# Patient Record
Sex: MALE | Race: BLACK OR AFRICAN AMERICAN | NOT HISPANIC OR LATINO | Employment: UNEMPLOYED | URBAN - METROPOLITAN AREA
[De-identification: names, ages, dates, MRNs, and addresses within clinical notes are randomized per-mention and may not be internally consistent; named-entity substitution may affect disease eponyms.]

---

## 2023-09-01 ENCOUNTER — HOSPITAL ENCOUNTER (EMERGENCY)
Facility: HOSPITAL | Age: 38
Discharge: HOME | End: 2023-09-01
Attending: EMERGENCY MEDICINE
Payer: COMMERCIAL

## 2023-09-01 VITALS
OXYGEN SATURATION: 100 % | SYSTOLIC BLOOD PRESSURE: 135 MMHG | DIASTOLIC BLOOD PRESSURE: 79 MMHG | RESPIRATION RATE: 20 BRPM | HEART RATE: 87 BPM | WEIGHT: 176.15 LBS | TEMPERATURE: 98.1 F

## 2023-09-01 DIAGNOSIS — J45.901 ASTHMA EXACERBATION: ICD-10-CM

## 2023-09-01 DIAGNOSIS — J06.9 URI (UPPER RESPIRATORY INFECTION): Primary | ICD-10-CM

## 2023-09-01 DIAGNOSIS — Z76.0 MEDICATION REFILL: ICD-10-CM

## 2023-09-01 LAB
FLUAV RNA RESP QL NAA+PROBE: NEGATIVE
FLUBV RNA RESP QL NAA+PROBE: NEGATIVE
RSV RNA RESP QL NAA+PROBE: NEGATIVE
SARS-COV-2 RNA RESP QL NAA+PROBE: NEGATIVE

## 2023-09-01 PROCEDURE — 99283 EMERGENCY DEPT VISIT LOW MDM: CPT

## 2023-09-01 PROCEDURE — 99284 EMERGENCY DEPT VISIT MOD MDM: CPT | Performed by: EMERGENCY MEDICINE

## 2023-09-01 PROCEDURE — 0241U HB NFCT DS VIR RESP RNA 4 TRGT: CPT | Performed by: EMERGENCY MEDICINE

## 2023-09-01 RX ORDER — ALBUTEROL SULFATE 90 UG/1
2 AEROSOL, METERED RESPIRATORY (INHALATION) EVERY 6 HOURS PRN
Qty: 18 G | Refills: 0 | Status: SHIPPED | OUTPATIENT
Start: 2023-09-01

## 2023-09-01 RX ORDER — ALBUTEROL SULFATE 90 UG/1
2 AEROSOL, METERED RESPIRATORY (INHALATION) ONCE
Status: COMPLETED | OUTPATIENT
Start: 2023-09-01 | End: 2023-09-01

## 2023-09-01 RX ORDER — LEVETIRACETAM 750 MG/1
750 TABLET ORAL 2 TIMES DAILY
Qty: 180 TABLET | Refills: 0 | Status: SHIPPED | OUTPATIENT
Start: 2023-09-01

## 2023-09-01 RX ORDER — AZITHROMYCIN 250 MG/1
TABLET, FILM COATED ORAL
Qty: 6 TABLET | Refills: 0 | Status: SHIPPED | OUTPATIENT
Start: 2023-09-01 | End: 2023-09-04

## 2023-09-01 RX ORDER — PREDNISONE 20 MG/1
60 TABLET ORAL DAILY
Qty: 15 TABLET | Refills: 0 | Status: SHIPPED | OUTPATIENT
Start: 2023-09-01 | End: 2023-09-04

## 2023-09-01 RX ORDER — LEVETIRACETAM 750 MG/1
750 TABLET ORAL 2 TIMES DAILY
COMMUNITY

## 2023-09-01 RX ADMIN — ALBUTEROL SULFATE 2 PUFF: 90 AEROSOL, METERED RESPIRATORY (INHALATION) at 13:34

## 2023-09-01 NOTE — Clinical Note
Kati aPtel was seen and treated in our emergency department on 9/1/2023. Diagnosis:     Alfonso  . He may return on this date: 09/01/2023    Patient was seen in the emergency room. [facility]     If you have any questions or concerns, please don't hesitate to call.       Elisha Friedman MD    ______________________________           _______________          _______________  Hospital Representative                              Date                                Time

## 2023-09-01 NOTE — ED PROVIDER NOTES
History  Chief Complaint   Patient presents with   • Cold Like Symptoms     "I have a scratchy throat, cough, chest cold and I just got here from [place] and need my seizure med- Keppra ordered "     And is a 71-year-old male. He is a smoker. [facility] Reports that [facility] the [facility] have been sick with a cough. He presents with 1 day history of cough and chest congestion. His cough is productive. He also has nasal congestion. He has left ear pain. He has had a sore throat. He is not short of breath. No chest pain. Denies fever. In addition he has seizure disorder. He is out of his Keppra. He denies having a seizure. Prior to Admission Medications   Prescriptions Last Dose Informant Patient Reported? Taking?   levETIRAcetam (KEPPRA) 750 mg tablet   Yes Yes   Sig: Take 750 mg by mouth 2 (two) times a day      Facility-Administered Medications: None       Past Medical History:   Diagnosis Date   • Epilepsy ([address])        History reviewed. No pertinent surgical history. History reviewed. No pertinent family history. I have reviewed and agree with the history as documented. E-Cigarette/Vaping     E-Cigarette/Vaping Substances     Social History     Tobacco Use   • Smoking status: Every Day     Types: Cigarettes   • Smokeless tobacco: Never   Substance Use Topics   • Alcohol use: Not Currently   • Drug use: Not Currently       Review of Systems   Constitutional: Negative for chills and fever. HENT: Positive for congestion, ear pain and sore throat. Eyes: Negative for pain, redness and visual disturbance. Respiratory: Positive for cough. Negative for shortness of breath. Cardiovascular: Negative for chest pain and leg swelling. Gastrointestinal: Negative for abdominal pain, diarrhea and vomiting. Endocrine: Negative for polydipsia and polyuria. Genitourinary: Negative for dysuria, frequency and hematuria.    Musculoskeletal: Negative for back pain and neck pain. Skin: Negative for rash and wound. Allergic/Immunologic: Negative for immunocompromised state. Neurological: Positive for seizures. Negative for weakness, numbness and headaches. Psychiatric/Behavioral: Negative for hallucinations and suicidal ideas. All other systems reviewed and are negative. Physical Exam  Physical Exam  Vitals reviewed. Constitutional:       General: He is not in acute distress. Appearance: He is not toxic-appearing. HENT:      Head: Normocephalic and atraumatic. Right Ear: Tympanic membrane normal.      Left Ear: Tympanic membrane normal.      Nose: Nose normal.      Mouth/Throat:      Mouth: Mucous membranes are moist.      Pharynx: Oropharynx is clear. No oropharyngeal exudate or posterior oropharyngeal erythema. Eyes:      General:         Right eye: No discharge. Left eye: No discharge. Conjunctiva/sclera: Conjunctivae normal.   Cardiovascular:      Rate and Rhythm: Normal rate and regular rhythm. Pulses: Normal pulses. Heart sounds: Normal heart sounds. No murmur heard. No friction rub. No gallop. Pulmonary:      Effort: Pulmonary effort is normal. No respiratory distress. Breath sounds: No stridor. Wheezing present. No rhonchi or rales. Comments: There is a scattered wheeze. Abdominal:      General: Bowel sounds are normal. There is no distension. Palpations: Abdomen is soft. Tenderness: There is no abdominal tenderness. There is no right CVA tenderness, left CVA tenderness, guarding or rebound. Musculoskeletal:         General: No swelling, tenderness, deformity or signs of injury. Normal range of motion. Cervical back: Normal range of motion and neck supple. No rigidity. Right lower leg: No edema. Left lower leg: No edema. Comments: No calf pain or unilateral leg swelling   Skin:     General: Skin is warm and dry.       Coloration: Skin is not jaundiced or pale. Findings: No bruising, erythema or rash. Neurological:      General: No focal deficit present. Mental Status: He is alert and oriented to person, place, and time. Cranial Nerves: No facial asymmetry. Sensory: No sensory deficit. Motor: Motor function is intact. Psychiatric:         Mood and Affect: Mood normal.         Behavior: Behavior normal.         Vital Signs  ED Triage Vitals [09/01/23 1316]   Temperature Pulse Respirations Blood Pressure SpO2   98.1 °F (36.7 °C) 87 20 135/79 100 %      Temp Source Heart Rate Source Patient Position - Orthostatic VS BP Location FiO2 (%)   Oral Monitor Sitting Left arm --      Pain Score       --           Vitals:    09/01/23 1316   BP: 135/79   Pulse: 87   Patient Position - Orthostatic VS: Sitting         Visual Acuity      ED Medications  Medications   albuterol (PROVENTIL HFA,VENTOLIN HFA) inhaler 2 puff (2 puffs Inhalation Given 9/1/23 1334)       Diagnostic Studies  Results Reviewed     Procedure Component Value Units Date/Time    FLU/RSV/COVID - if FLU/RSV clinically relevant [[id number]] Collected: 09/01/23 1334    Lab Status: In process Specimen: Nares from Nose Updated: 09/01/23 1340                 No orders to display              Procedures  Procedures         ED Course                               SBIRT 22yo+    Flowsheet Row Most Recent Value   Initial Alcohol Screen: US AUDIT-C     1. How often do you have a drink containing alcohol? 0 Filed at: 09/01/2023 1317   3a. Male UNDER 65: How often do you have five or more drinks on one occasion? 0 Filed at: 09/01/2023 1317   Audit-C Score 0 Filed at: 09/01/2023 1317   JORDAN: How many times in the past year have you. .. Used an illegal drug or used a prescription medication for non-medical reasons? Never Filed at: 09/01/2023 1317                    Medical Decision Making  Patient does have a wheeze. No respiratory distress. Will discharge with inhaler and steroids. Will place on antibiotics to cover bacterial bronchitis/copd. Doubt pneumonia. We will test for COVID and flu. Doubt pulmonary embolism or congestive heart failure. We will refill seizure medications. Risk  Prescription drug management. Decision regarding hospitalization. Disposition  Final diagnoses:   URI (upper respiratory infection)   Asthma exacerbation   Medication refill     Time reflects when diagnosis was documented in both MDM as applicable and the Disposition within this note     Time User Action Codes Description Comment    9/1/2023  1:29 PM Toronto Amas Add [J06.9] URI (upper respiratory infection)     9/1/2023  1:29 PM Toronto Amas Add [N68.684] Asthma exacerbation     9/1/2023  1:29 PM Toronto Amas Add [Z76.0] Medication refill       ED Disposition     ED Disposition   Discharge    Condition   Stable    Date/Time   Fri Sep 1, 2023  1:28 PM    Comment   Bonifacio Love discharge to home/self care. Follow-up Information     Follow up With Specialties Details [facility] In [facility] week  [address]  [phone]            Discharge Medication List as of 9/1/2023  1:33 PM      START taking these medications    Details   albuterol (ProAir HFA) 90 mcg/act inhaler Inhale 2 puffs every 6 (six) hours as needed for wheezing, Starting Fri 9/1/2023, Normal      azithromycin (Zithromax Z-Pedro Pablo) 250 mg tablet Take 2 tablets today then 1 tablet daily x 4 days, Normal      !! levETIRAcetam (Keppra) 750 mg tablet Take 1 tablet (750 mg total) by mouth 2 (two) times a day, Starting Fri 9/1/2023, Normal      predniSONE 20 mg tablet Take 3 tablets (60 mg total) by mouth daily for 5 days, Starting Fri 9/1/2023, Until Wed 9/6/2023, Normal       !! - Potential duplicate medications found. Please discuss with provider.       CONTINUE these medications which have NOT CHANGED    Details   !! levETIRAcetam (KEPPRA) 750 mg tablet Take 750 mg by mouth 2 (two) times a day, Historical Med       !! - Potential duplicate medications found. Please discuss with provider. No discharge procedures on file.     PDMP Review     None          ED Provider  Electronically Signed by           Matthew Hudson MD  09/01/23 [id number]

## 2023-09-04 ENCOUNTER — HOSPITAL ENCOUNTER (EMERGENCY)
Facility: HOSPITAL | Age: 38
Discharge: HOME | End: 2023-09-04
Attending: EMERGENCY MEDICINE
Payer: COMMERCIAL

## 2023-09-04 VITALS
TEMPERATURE: 97.1 F | OXYGEN SATURATION: 99 % | BODY MASS INDEX: 23.72 KG/M2 | HEART RATE: 73 BPM | HEIGHT: 73 IN | WEIGHT: 179 LBS | SYSTOLIC BLOOD PRESSURE: 127 MMHG | DIASTOLIC BLOOD PRESSURE: 82 MMHG | RESPIRATION RATE: 18 BRPM

## 2023-09-04 DIAGNOSIS — F41.9 ANXIETY: Primary | ICD-10-CM

## 2023-09-04 DIAGNOSIS — Z76.0 MEDICATION REFILL: ICD-10-CM

## 2023-09-04 LAB
BACTERIA UR QL AUTO: ABNORMAL /HPF
BILIRUB UR QL STRIP: NEGATIVE
CLARITY UR: CLEAR
COLOR UR: ABNORMAL
GLUCOSE UR STRIP-MCNC: NEGATIVE MG/DL
HGB UR QL STRIP.AUTO: 10
KETONES UR STRIP-MCNC: NEGATIVE MG/DL
LEUKOCYTE ESTERASE UR QL STRIP: 25
NITRITE UR QL STRIP: NEGATIVE
NON-SQ EPI CELLS URNS QL MICRO: ABNORMAL /HPF
PH UR STRIP.AUTO: 5 [PH]
PROT UR STRIP-MCNC: NEGATIVE MG/DL
RBC #/AREA URNS AUTO: ABNORMAL /HPF
SP GR UR STRIP.AUTO: 1.02 (ref 1–1.04)
UROBILINOGEN UA: 1 MG/DL
WBC #/AREA URNS AUTO: ABNORMAL /HPF

## 2023-09-04 PROCEDURE — 99284 EMERGENCY DEPT VISIT MOD MDM: CPT | Performed by: EMERGENCY MEDICINE

## 2023-09-04 PROCEDURE — 87591 N.GONORRHOEAE DNA AMP PROB: CPT | Performed by: EMERGENCY MEDICINE

## 2023-09-04 PROCEDURE — 81001 URINALYSIS AUTO W/SCOPE: CPT | Performed by: EMERGENCY MEDICINE

## 2023-09-04 PROCEDURE — 99283 EMERGENCY DEPT VISIT LOW MDM: CPT

## 2023-09-04 PROCEDURE — 87491 CHLMYD TRACH DNA AMP PROBE: CPT | Performed by: EMERGENCY MEDICINE

## 2023-09-04 RX ORDER — BUPROPION HYDROCHLORIDE 75 MG/1
75 TABLET ORAL 2 TIMES DAILY
Qty: 14 TABLET | Refills: 0 | Status: SHIPPED | OUTPATIENT
Start: 2023-09-04 | End: 2023-09-11

## 2023-09-04 NOTE — ED PROVIDER NOTES
History  Chief Complaint   Patient presents with   • Anxiety     Patient reports he just got to [place] about 1m ago[facility]. Takes wellbutrin for anxiety but has ran out, does not know the dose. "I feel anxious being around all those men". Insurance kicks in [facility], he needs resources for PCP and psychiatrist. Also reports he wants a STD test, he already rec'd tx in his home state of [place] but "I never got the results". -SI/-HI. 59-year-old male, history of anxiety, epilepsy, maintained on Keppra. States he takes Wellbutrin for his anxiety and agoraphobia. He has run out of medications due to waiting for his insurance to kick in and remove them from out of state. Requesting medication refill and PCP referral.  States he feels better being away from the homeless shelter where he feels like things are overcrowded. Denies any suicidality or homicidality denies any auditory or visual hallucinations. Prior to Admission Medications   Prescriptions Last Dose Informant Patient Reported? Taking? albuterol (ProAir HFA) 90 mcg/act inhaler   No No   Sig: Inhale 2 puffs every 6 (six) hours as needed for wheezing   levETIRAcetam (KEPPRA) 750 mg tablet   Yes No   Sig: Take 750 mg by mouth 2 (two) times a day   levETIRAcetam (Keppra) 750 mg tablet   No No   Sig: Take 1 tablet (750 mg total) by mouth 2 (two) times a day      Facility-Administered Medications: None       Past Medical History:   Diagnosis Date   • Epilepsy ([address])        History reviewed. No pertinent surgical history. History reviewed. No pertinent family history. I have reviewed and agree with the history as documented.     E-Cigarette/Vaping   • E-Cigarette Use Never User      E-Cigarette/Vaping Substances     Social History     Tobacco Use   • Smoking status: Every Day     Types: Cigarettes, Cigars   • Smokeless tobacco: Never   Vaping Use   • Vaping Use: Never used   Substance Use Topics   • Alcohol use: Not Currently   • Drug use: Yes     Frequency: 4.0 times per week     Types: Marijuana       Review of Systems   Constitutional: Negative. Negative for chills and fever. HENT: Negative. Negative for rhinorrhea, sore throat, trouble swallowing and voice change. Eyes: Negative. Negative for pain and visual disturbance. Respiratory: Negative. Negative for cough, shortness of breath and wheezing. Cardiovascular: Negative. Negative for chest pain and palpitations. Gastrointestinal: Negative for abdominal pain, diarrhea, nausea and vomiting. Genitourinary: Negative. Negative for dysuria and frequency. Musculoskeletal: Negative. Negative for neck pain and neck stiffness. Skin: Negative. Negative for rash. Neurological: Negative. Negative for dizziness, speech difficulty, weakness, light-headedness and numbness. Psychiatric/Behavioral: The patient is nervous/anxious. Physical Exam  Physical Exam  Vitals and nursing note reviewed. Constitutional:       General: He is not in acute distress. Appearance: He is well-developed. HENT:      Head: Normocephalic and atraumatic. Eyes:      Conjunctiva/sclera: Conjunctivae normal.      Pupils: Pupils are equal, round, and reactive to light. Neck:      Trachea: No tracheal deviation. Cardiovascular:      Rate and Rhythm: Normal rate and regular rhythm. Pulmonary:      Effort: Pulmonary effort is normal. No respiratory distress. Breath sounds: Normal breath sounds. No wheezing or rales. Abdominal:      General: Bowel sounds are normal. There is no distension. Palpations: Abdomen is soft. Tenderness: There is no abdominal tenderness. There is no guarding or rebound. Musculoskeletal:         General: No tenderness or deformity. Normal range of motion. Cervical back: Normal range of motion and neck supple. Skin:     General: Skin is warm and dry. Capillary Refill: Capillary refill takes less than 2 seconds. Findings: No rash. Neurological:      Mental Status: He is alert and oriented to person, place, and time. Psychiatric:         Behavior: Behavior normal.         Vital Signs  ED Triage Vitals   Temperature Pulse Respirations Blood Pressure SpO2   09/04/23 1255 09/04/23 1232 09/04/23 1232 09/04/23 1232 09/04/23 1232   (!) 97.1 °F (36.2 °C) 73 18 127/82 99 %      Temp Source Heart Rate Source Patient Position - Orthostatic VS BP Location FiO2 (%)   09/04/23 1255 09/04/23 1232 09/04/23 1232 09/04/23 1232 --   Tympanic Monitor Lying Left arm       Pain Score       09/04/23 1232       No Pain           Vitals:    09/04/23 1232   BP: 127/82   Pulse: 73   Patient Position - Orthostatic VS: Lying         Visual Acuity      ED Medications  Medications - No data to display    Diagnostic Studies  Results Reviewed     Procedure Component Value Units Date/Time    Urine Microscopic [[id number]]  (Abnormal) Collected: 09/04/23 1302    Lab Status: Final result Specimen: Urine, Other Updated: 09/04/23 1407     RBC, UA 1-2 /hpf      WBC, UA 4-10 /hpf      Epithelial Cells Occasional /hpf      Bacteria, UA Occasional /hpf     UA (URINE) with reflex to Scope [[id number]]  (Abnormal) Collected: 09/04/23 1302    Lab Status: Final result Specimen: Urine, Other Updated: 09/04/23 1316     Color, UA Callie     Clarity, UA Clear     Specific Gravity, UA 1.025     pH, UA 5.0     Leukocytes, UA 25.0     Nitrite, UA Negative     Protein, UA Negative mg/dl      Glucose, UA Negative mg/dl      Ketones, UA Negative mg/dl      Bilirubin, UA Negative     Occult Blood, UA 10.0     UROBILINOGEN UA 1.0 mg/dL     Chlamydia/GC amplified DNA by PCR [[id number]] Collected: 09/04/23 1302    Lab Status:  In process Specimen: Urine, Other Updated: 09/04/23 1307                 No orders to display              Procedures  Procedures         ED Course                               SBIRT 20yo+    Flowsheet Row Most Recent Value   Initial Alcohol Screen: US AUDIT-C     1. How often do you have a drink containing alcohol? 0 Filed at: 09/04/2023 1233   2. How many drinks containing alcohol do you have on a typical day you are drinking? 0 Filed at: 09/04/2023 1233   3a. Male UNDER 65: How often do you have five or more drinks on one occasion? 0 Filed at: 09/04/2023 1233   3b. FEMALE Any Age, or MALE 65+: How often do you have 4 or more drinks on one occassion? 0 Filed at: 09/04/2023 1233   Audit-C Score 0 Filed at: 09/04/2023 1233   JORDAN: How many times in the past year have you. .. Used an illegal drug or used a prescription medication for non-medical reasons? Daily or Almost Daily Filed at: 09/04/2023 1233   DAST-10: In the past 12 months. ..    1. Have you used drugs other than those required for medical reasons? 0 Filed at: 09/04/2023 1233   2. Do you use more than one drug at a time? 0 Filed at: 09/04/2023 1233   3. Have you had medical problems as a result of your drug use (e.g., memory loss, hepatitis, convulsions, bleeding, etc.)? 0 Filed at: 09/04/2023 1233   4. Have you had "blackouts" or "flashbacks" as a result of drug use? YesNo 0 Filed at: 09/04/2023 1233   5. Do you ever feel bad or guilty about your drug use? 0 Filed at: 09/04/2023 1233   6. Does your spouse (or parent) ever complain about your involvement with drugs? 0 Filed at: 09/04/2023 1233   7. Have you neglected your family because of your use of drugs? 0 Filed at: 09/04/2023 1233   8. Have you engaged in illegal activities in order to obtain drugs? 0 Filed at: 09/04/2023 1233   9. Have you ever experienced withdrawal symptoms (felt sick) when you stopped taking drugs? 0 Filed at: 09/04/2023 1233   10. Are you always able to stop using drugs when you want to? 0 Filed at: 09/04/2023 1233   DAST-10 Score 0 Filed at: 09/04/2023 1233                    Medical Decision Making  Well-appearing 25-year-old male, presenting to the ED for medication help.   After initial evaluation he also endorses having some penile drainage, states he was tested for STDs and treated in [place]. Denies any sick contacts. States he is asymptomatic except for noticing what he thought might have been some discharge in his underwear over the last few days. No testicular pain no rashes or lesions. He is requesting repeat STD testing today. He is declining any prophylactic antibiotics. States his phone works and he will be able to follow-up and be reached for any test results if he is positive. Amount and/or Complexity of Data Reviewed  Labs: ordered. Risk  Prescription drug management. Disposition  Final diagnoses:   Anxiety   Medication refill     Time reflects when diagnosis was documented in both MDM as applicable and the Disposition within this note     Time User Action Codes Description Comment    9/4/2023 12:57 PM Subha Coello Add [F41.9] Anxiety     9/4/2023  1:02 PM Subha Coello Add [Z76.0] Medication refill       ED Disposition     ED Disposition   Discharge    Condition   Stable    Date/Time   Mon Sep 4, 2023 [address] discharge to home/self care.                Follow-up Information     Follow up With Specialties Details Why Contact Info Cj Carpenter In 1 week  [address] [facility], [facility] [facility] [id number]  [address] [facility], [address] [facility], [address] [facility]          Discharge Medication List as of 9/4/2023  1:04 PM      START taking these medications    Details   buPROPion [facility]) 75 mg tablet Take 1 tablet (75 mg total) by mouth 2 (two) times a day for 7 days, Starting Mon 9/4/2023, Until Mon 9/11/2023, Normal         CONTINUE these medications which have NOT CHANGED    Details   albuterol (ProAir HFA) 90 mcg/act inhaler Inhale 2 puffs every 6 (six) hours as needed for wheezing, Starting Fri 9/1/2023, Normal      !! levETIRAcetam (KEPPRA) 750 mg tablet Take 750 mg by mouth 2 (two) times a day, Historical Med      !! levETIRAcetam (Keppra) 750 mg tablet Take 1 tablet (750 mg total) by mouth 2 (two) times a day, Starting Fri 9/1/2023, Normal       !! - Potential duplicate medications found. Please discuss with provider. No discharge procedures on file.     PDMP Review     None          ED Provider  Electronically Signed by           Jessica Underwood DO  09/04/23 3777

## 2023-09-05 LAB
C TRACH DNA SPEC QL NAA+PROBE: NEGATIVE
N GONORRHOEA DNA SPEC QL NAA+PROBE: NEGATIVE

## 2024-07-30 ENCOUNTER — HOSPITAL ENCOUNTER (EMERGENCY)
Facility: HOSPITAL | Age: 39
Discharge: HOME | End: 2024-07-30
Attending: EMERGENCY MEDICINE | Admitting: EMERGENCY MEDICINE
Payer: COMMERCIAL

## 2024-07-30 VITALS
HEART RATE: 72 BPM | SYSTOLIC BLOOD PRESSURE: 132 MMHG | DIASTOLIC BLOOD PRESSURE: 71 MMHG | RESPIRATION RATE: 20 BRPM | WEIGHT: 165.9 LBS | BODY MASS INDEX: 21.89 KG/M2 | TEMPERATURE: 98.7 F | OXYGEN SATURATION: 99 %

## 2024-07-30 DIAGNOSIS — N48.9 PENILE LESION: Primary | ICD-10-CM

## 2024-07-30 PROCEDURE — 99283 EMERGENCY DEPT VISIT LOW MDM: CPT | Performed by: PHYSICIAN ASSISTANT

## 2024-07-30 PROCEDURE — 99283 EMERGENCY DEPT VISIT LOW MDM: CPT

## 2024-07-30 NOTE — ED PROVIDER NOTES
History  Chief Complaint   Patient presents with    Penis / Scrotum Problem     States he has had warts on his penis for 1.5 weeks     38-year-old sexually active male presenting for evaluation of lesions to the penis he reports they are nonpainful he reports no other associated symptoms, dysuria, hematuria, scrotal pain or swelling, lesions to the scrotum, abdominal pain, nausea, vomiting.  He reports no penile discharge.      Penis / Scrotum Problem  Presenting symptoms: no dysuria    Associated symptoms: no abdominal pain, no fever, no hematuria, no nausea and no vomiting        Prior to Admission Medications   Prescriptions Last Dose Informant Patient Reported? Taking?   albuterol (ProAir HFA) 90 mcg/act inhaler   No No   Sig: Inhale 2 puffs every 6 (six) hours as needed for wheezing   buPROPion (WELLBUTRIN) 75 mg tablet   No No   Sig: Take 1 tablet (75 mg total) by mouth 2 (two) times a day for 7 days   levETIRAcetam (KEPPRA) 750 mg tablet   Yes No   Sig: Take 750 mg by mouth 2 (two) times a day   levETIRAcetam (Keppra) 750 mg tablet   No No   Sig: Take 1 tablet (750 mg total) by mouth 2 (two) times a day      Facility-Administered Medications: None       Past Medical History:   Diagnosis Date    Epilepsy (HCC)        History reviewed. No pertinent surgical history.    History reviewed. No pertinent family history.  I have reviewed and agree with the history as documented.    E-Cigarette/Vaping    E-Cigarette Use Never User      E-Cigarette/Vaping Substances     Social History     Tobacco Use    Smoking status: Every Day     Types: Cigarettes, Cigars    Smokeless tobacco: Never   Vaping Use    Vaping status: Never Used   Substance Use Topics    Alcohol use: Yes     Comment: occ    Drug use: Yes     Frequency: 4.0 times per week     Types: Marijuana       Review of Systems   Constitutional:  Negative for chills, fatigue and fever.   HENT:  Negative for congestion, ear pain, rhinorrhea and sore throat.    Eyes:   Negative for redness.   Respiratory:  Negative for chest tightness and shortness of breath.    Cardiovascular:  Negative for chest pain and palpitations.   Gastrointestinal:  Negative for abdominal pain, nausea and vomiting.   Genitourinary:  Negative for dysuria and hematuria.        Penile lesions   Musculoskeletal: Negative.    Skin:  Negative for rash.   Neurological:  Negative for dizziness, syncope, light-headedness and numbness.       Physical Exam  Physical Exam  Vitals and nursing note reviewed.   Constitutional:       Appearance: Normal appearance. He is well-developed.   HENT:      Head: Normocephalic and atraumatic.   Eyes:      General: No scleral icterus.     Pupils: Pupils are equal, round, and reactive to light.   Cardiovascular:      Rate and Rhythm: Normal rate and regular rhythm.      Pulses: Normal pulses.   Pulmonary:      Effort: Pulmonary effort is normal. No respiratory distress.      Breath sounds: No stridor.   Abdominal:      General: There is no distension.      Palpations: There is no mass.   Genitourinary:         Comments: Erin RN at Bedside chaperone for exam  Painless, raised, nonerythematous, flesh-colored lesions in the area as depicted above.  There are no ulcers, lymphadenopathy or lesions consistent with herpetic rash.  Musculoskeletal:      Cervical back: Normal range of motion.   Skin:     General: Skin is warm and dry.      Capillary Refill: Capillary refill takes less than 2 seconds.      Coloration: Skin is not jaundiced.   Neurological:      Mental Status: He is alert and oriented to person, place, and time.      Gait: Gait normal.   Psychiatric:         Mood and Affect: Mood normal.         Vital Signs  ED Triage Vitals [07/30/24 0951]   Temperature Pulse Respirations Blood Pressure SpO2   98.7 °F (37.1 °C) 72 20 132/71 99 %      Temp Source Heart Rate Source Patient Position - Orthostatic VS BP Location FiO2 (%)   Tympanic Monitor Lying Left arm --      Pain Score      "  --           Vitals:    07/30/24 0951   BP: 132/71   Pulse: 72   Patient Position - Orthostatic VS: Lying         Visual Acuity      ED Medications  Medications - No data to display    Diagnostic Studies  Results Reviewed       None                   No orders to display              Procedures  Procedures         ED Course  ED Course as of 07/30/24 0958   Tue Jul 30, 2024   0956 Patient was reexamined at this time and was found to be stable for discharge.  Return to emergency department criteria was reviewed with the patient who verbalized understanding and was agreeable to discharge and the treatment plan at this time.                                               Medical Decision Making  38-year-old male presenting for evaluation of painless raised flesh-colored lesions to the penis after unprotected intercourse.  He denies other associated symptoms and was offered STI testing however declined reporting he would present immediately to the [facility] for free rapid testing and follow-up with family care provider/urology.  These follow-up locations and phone numbers were provided for the patient.  Lesions are consistent with genital warts versus other STI, lower suspicion for herpetic lesions versus syphilis given lack of erythematous, ulcerative, lesions with lymphadenopathy.    Strict return to ED precautions discussed. Patient recommended to follow up promptly with appropriate outpatient provider.Patient and/or family members verbalizes understanding and agrees with plan. Patient is stable for discharge.     Portions of the record may have been created with voice recognition software. Occasional wrong word or \"sound a like\" substitutions may have occurred due to the inherent limitations of voice recognition software. Read the chart carefully and recognize, using context, where substitutions have occurred.                     Disposition  Final diagnoses:   Penile lesion     Time reflects when " diagnosis was documented in both MDM as applicable and the Disposition within this note       Time User Action Codes Description Comment    7/30/2024  9:56 AM Najma Austin Add [N48.9] Penile lesion           ED Disposition       ED Disposition   Discharge    Condition   Stable    Date/Time   Tue Jul 30, 2024  9:56 AM    Comment   Alfonsous Tremayne Williams discharge to home/self care.                   Follow-up Information       Follow up With Specialties Details Why Contact Info Additional Information    [facility] Internal Medicine Schedule an appointment as soon as possible for a visit in 1 day for further testing [address] [address]  [phone]       Stephen Henning MD Family Medicine Schedule an appointment as soon as possible for a visit   [address] [facility] [place] [address]  [phone]       [facility] [address] [address]  [phone] [address], [place], [place], [address]   [phone]            Patient's Medications   Discharge Prescriptions    No medications on file       No discharge procedures on file.    PDMP Review       None            ED Provider  Electronically Signed by             Najma Austin PA-C  07/30/24 1168

## 2024-08-04 ENCOUNTER — HOSPITAL ENCOUNTER (EMERGENCY)
Facility: HOSPITAL | Age: 39
Discharge: HOME | End: 2024-08-04
Attending: EMERGENCY MEDICINE
Payer: COMMERCIAL

## 2024-08-04 VITALS
HEART RATE: 65 BPM | DIASTOLIC BLOOD PRESSURE: 92 MMHG | SYSTOLIC BLOOD PRESSURE: 148 MMHG | RESPIRATION RATE: 16 BRPM | OXYGEN SATURATION: 96 % | TEMPERATURE: 97.5 F

## 2024-08-04 DIAGNOSIS — K08.89 DENTALGIA: Primary | ICD-10-CM

## 2024-08-04 PROCEDURE — 99284 EMERGENCY DEPT VISIT MOD MDM: CPT

## 2024-08-04 PROCEDURE — 96372 THER/PROPH/DIAG INJ SC/IM: CPT

## 2024-08-04 PROCEDURE — 99282 EMERGENCY DEPT VISIT SF MDM: CPT

## 2024-08-04 RX ORDER — CLINDAMYCIN HYDROCHLORIDE 150 MG/1
450 CAPSULE ORAL ONCE
Status: COMPLETED | OUTPATIENT
Start: 2024-08-04 | End: 2024-08-04

## 2024-08-04 RX ORDER — CLINDAMYCIN HYDROCHLORIDE 150 MG/1
450 CAPSULE ORAL 3 TIMES DAILY
Qty: 63 CAPSULE | Refills: 0 | Status: SHIPPED | OUTPATIENT
Start: 2024-08-04 | End: 2024-08-05

## 2024-08-04 RX ORDER — ACETAMINOPHEN 500 MG
1000 TABLET ORAL EVERY 6 HOURS PRN
Qty: 60 TABLET | Refills: 0 | Status: SHIPPED | OUTPATIENT
Start: 2024-08-04

## 2024-08-04 RX ORDER — IBUPROFEN 800 MG/1
800 TABLET ORAL EVERY 8 HOURS PRN
Qty: 21 TABLET | Refills: 0 | Status: SHIPPED | OUTPATIENT
Start: 2024-08-04

## 2024-08-04 RX ORDER — KETOROLAC TROMETHAMINE 30 MG/ML
15 INJECTION, SOLUTION INTRAMUSCULAR; INTRAVENOUS ONCE
Status: COMPLETED | OUTPATIENT
Start: 2024-08-04 | End: 2024-08-04

## 2024-08-04 RX ADMIN — CLINDAMYCIN HYDROCHLORIDE 450 MG: 150 CAPSULE ORAL at 00:59

## 2024-08-04 RX ADMIN — KETOROLAC TROMETHAMINE 15 MG: 30 INJECTION, SOLUTION INTRAMUSCULAR; INTRAVENOUS at 01:00

## 2024-08-05 RX ORDER — CLINDAMYCIN HYDROCHLORIDE 150 MG/1
450 CAPSULE ORAL 3 TIMES DAILY
Qty: 63 CAPSULE | Refills: 0 | Status: SHIPPED | OUTPATIENT
Start: 2024-08-05 | End: 2024-08-12

## 2025-01-28 ENCOUNTER — HOSPITAL ENCOUNTER (EMERGENCY)
Facility: HOSPITAL | Age: 40
Discharge: HOME | End: 2025-01-28
Attending: EMERGENCY MEDICINE
Payer: COMMERCIAL

## 2025-01-28 VITALS
BODY MASS INDEX: 22.43 KG/M2 | OXYGEN SATURATION: 99 % | HEART RATE: 87 BPM | WEIGHT: 170 LBS | SYSTOLIC BLOOD PRESSURE: 118 MMHG | TEMPERATURE: 99.2 F | DIASTOLIC BLOOD PRESSURE: 74 MMHG | RESPIRATION RATE: 20 BRPM

## 2025-01-28 DIAGNOSIS — J10.1 INFLUENZA A: Primary | ICD-10-CM

## 2025-01-28 DIAGNOSIS — R11.0 NAUSEA: ICD-10-CM

## 2025-01-28 DIAGNOSIS — M79.10 MYALGIA: ICD-10-CM

## 2025-01-28 LAB
FLUAV RNA RESP QL NAA+PROBE: POSITIVE
FLUBV RNA RESP QL NAA+PROBE: NEGATIVE
RSV RNA RESP QL NAA+PROBE: NEGATIVE
SARS-COV-2 RNA RESP QL NAA+PROBE: NEGATIVE

## 2025-01-28 PROCEDURE — 96361 HYDRATE IV INFUSION ADD-ON: CPT

## 2025-01-28 PROCEDURE — 96375 TX/PRO/DX INJ NEW DRUG ADDON: CPT

## 2025-01-28 PROCEDURE — 96374 THER/PROPH/DIAG INJ IV PUSH: CPT

## 2025-01-28 PROCEDURE — 96376 TX/PRO/DX INJ SAME DRUG ADON: CPT

## 2025-01-28 PROCEDURE — 99284 EMERGENCY DEPT VISIT MOD MDM: CPT | Performed by: EMERGENCY MEDICINE

## 2025-01-28 PROCEDURE — 99283 EMERGENCY DEPT VISIT LOW MDM: CPT

## 2025-01-28 PROCEDURE — 0241U HB NFCT DS VIR RESP RNA 4 TRGT: CPT | Performed by: EMERGENCY MEDICINE

## 2025-01-28 RX ORDER — ACETAMINOPHEN 325 MG/1
975 TABLET ORAL ONCE
Status: COMPLETED | OUTPATIENT
Start: 2025-01-28 | End: 2025-01-28

## 2025-01-28 RX ORDER — KETOROLAC TROMETHAMINE 30 MG/ML
15 INJECTION, SOLUTION INTRAMUSCULAR; INTRAVENOUS ONCE
Status: COMPLETED | OUTPATIENT
Start: 2025-01-28 | End: 2025-01-28

## 2025-01-28 RX ORDER — NAPROXEN 500 MG/1
500 TABLET ORAL 2 TIMES DAILY WITH MEALS
Qty: 20 TABLET | Refills: 0 | Status: SHIPPED | OUTPATIENT
Start: 2025-01-28

## 2025-01-28 RX ORDER — ONDANSETRON 4 MG/1
4 TABLET, ORALLY DISINTEGRATING ORAL EVERY 6 HOURS PRN
Qty: 20 TABLET | Refills: 0 | Status: SHIPPED | OUTPATIENT
Start: 2025-01-28

## 2025-01-28 RX ORDER — ONDANSETRON 2 MG/ML
4 INJECTION INTRAMUSCULAR; INTRAVENOUS ONCE
Status: COMPLETED | OUTPATIENT
Start: 2025-01-28 | End: 2025-01-28

## 2025-01-28 RX ADMIN — ONDANSETRON 4 MG: 2 INJECTION INTRAMUSCULAR; INTRAVENOUS at 14:55

## 2025-01-28 RX ADMIN — KETOROLAC TROMETHAMINE 15 MG: 30 INJECTION, SOLUTION INTRAMUSCULAR; INTRAVENOUS at 14:55

## 2025-01-28 RX ADMIN — KETOROLAC TROMETHAMINE 15 MG: 30 INJECTION, SOLUTION INTRAMUSCULAR; INTRAVENOUS at 16:46

## 2025-01-28 RX ADMIN — SODIUM CHLORIDE 1000 ML: 0.9 INJECTION, SOLUTION INTRAVENOUS at 14:50

## 2025-01-28 RX ADMIN — ACETAMINOPHEN 975 MG: 325 TABLET, FILM COATED ORAL at 14:55

## 2025-01-28 NOTE — ED PROVIDER NOTES
Time reflects when diagnosis was documented in both MDM as applicable and the Disposition within this note       Time User Action Codes Description Comment    1/28/2025  3:35 PM Niyah Becerril Add [J10.1] Influenza A     1/28/2025  4:24 PM Niyah Becerril KIMMIE Add [M79.10] Myalgia     1/28/2025  4:24 PM Niyah Becerril Add [R11.0] Nausea           ED Disposition       ED Disposition   Discharge    Condition   Stable    Date/Time   Tue Jan 28, 2025  4:24 PM    Comment   Marcus Tremayne Williams discharge to home/self care.                   Assessment & Plan       Medical Decision Making  Flu like symptoms, likely viral - Will send COVID/flu and give symptomatic tx.    Amount and/or Complexity of Data Reviewed  Labs:  Decision-making details documented in ED Course.    Risk  OTC drugs.  Prescription drug management.        ED Course as of 01/28/25 1743   Tue Jan 28, 2025   1437 Temperature: 99.2 °F (37.3 °C)  afebrile   1535 INFLU A PCR(!): Positive   1637 Updated pt on swab result.       Medications   sodium chloride 0.9 % bolus 1,000 mL (0 mL Intravenous Stopped 1/28/25 1632)   ketorolac (TORADOL) injection 15 mg (15 mg Intravenous Given 1/28/25 1455)   acetaminophen (TYLENOL) tablet 975 mg (975 mg Oral Given 1/28/25 1455)   ondansetron (ZOFRAN) injection 4 mg (4 mg Intravenous Given 1/28/25 1455)   ketorolac (TORADOL) injection 15 mg (15 mg Intravenous Given 1/28/25 1646)       ED Risk Strat Scores                                              History of Present Illness       Chief Complaint   Patient presents with    Generalized Body Aches     Pt c/o fever and generalized body aches that started last nighty. Pt states he also has a cough.        Past Medical History:   Diagnosis Date    Epilepsy (HCC)       No past surgical history on file.   No family history on file.   Social History     Tobacco Use    Smoking status: Every Day     Types: Cigarettes, Cigars    Smokeless tobacco: Never   Vaping Use    Vaping  status: Never Used   Substance Use Topics    Alcohol use: Yes     Comment: occ    Drug use: Yes     Frequency: 4.0 times per week     Types: Marijuana      E-Cigarette/Vaping    E-Cigarette Use Never User       E-Cigarette/Vaping Substances      I have reviewed and agree with the history as documented.     39 y.o. M p/w flu like symptoms x last night.  + sick contacts.      History provided by:  Patient   used: No    Generalized Body Aches  Associated symptoms: cough, fatigue, fever (Subjective), headaches, myalgias, nausea and rhinorrhea    Associated symptoms: no abdominal pain, no chest pain, no diarrhea, no sore throat and no vomiting        Review of Systems   Constitutional:  Positive for fatigue and fever (Subjective).   HENT:  Positive for rhinorrhea. Negative for sore throat.    Respiratory:  Positive for cough.    Cardiovascular:  Negative for chest pain.   Gastrointestinal:  Positive for nausea. Negative for abdominal pain, diarrhea and vomiting.   Musculoskeletal:  Positive for myalgias.   Neurological:  Positive for headaches. Negative for weakness.           Objective       ED Triage Vitals [01/28/25 1432]   Temperature Pulse Blood Pressure Respirations SpO2 Patient Position - Orthostatic VS   99.2 °F (37.3 °C) 87 118/74 20 99 % Lying      Temp Source Heart Rate Source BP Location FiO2 (%) Pain Score    Oral -- Right arm -- 4      Vitals      Date and Time Temp Pulse SpO2 Resp BP Pain Score FACES Pain Rating User   01/28/25 1455 -- -- -- -- -- 4 -- [facility]   01/28/25 1432 99.2 °F (37.3 °C) 87 99 % 20 118/74 4 -- [facility]            Physical Exam  Vitals and nursing note reviewed.   Constitutional:       General: He is not in acute distress.     Appearance: He is well-developed. He is not ill-appearing, toxic-appearing or diaphoretic.   HENT:      Head: Normocephalic and atraumatic.      Right Ear: Tympanic membrane normal. No drainage. No middle ear effusion. Tympanic membrane is not  injected, erythematous or bulging.      Left Ear: Tympanic membrane normal. No drainage.  No middle ear effusion. Tympanic membrane is not injected, erythematous or bulging.      Nose: Nose normal.      Mouth/Throat:      Pharynx: Oropharynx is clear. Uvula midline. No pharyngeal swelling, oropharyngeal exudate, posterior oropharyngeal erythema or uvula swelling.      Tonsils: No tonsillar exudate or tonsillar abscesses.   Eyes:      General:         Right eye: No discharge.         Left eye: No discharge.      Conjunctiva/sclera: Conjunctivae normal.      Right eye: Right conjunctiva is not injected.      Left eye: Left conjunctiva is not injected.   Neck:      Trachea: Trachea and phonation normal.   Cardiovascular:      Rate and Rhythm: Normal rate and regular rhythm.      Heart sounds: Normal heart sounds. No murmur heard.     No friction rub.   Pulmonary:      Effort: Pulmonary effort is normal. No accessory muscle usage or respiratory distress.      Breath sounds: Normal breath sounds. No stridor. No wheezing, rhonchi or rales.   Abdominal:      General: There is no distension.      Palpations: Abdomen is soft.      Tenderness: There is no abdominal tenderness. There is no guarding or rebound.   Musculoskeletal:      Cervical back: Normal range of motion. No rigidity.   Lymphadenopathy:      Cervical: No cervical adenopathy.   Skin:     General: Skin is warm and dry.      Coloration: Skin is not pale.      Findings: No rash.   Neurological:      Mental Status: He is alert.      GCS: GCS eye subscore is 4. GCS verbal subscore is 5. GCS motor subscore is 6.   Psychiatric:         Behavior: Behavior is cooperative.         Results Reviewed       Procedure Component Value Units Date/Time    FLU/RSV/COVID - if FLU/RSV clinically relevant (2hr TAT) [[id number]]  (Abnormal) Collected: 01/28/25 1451    Lab Status: Final result Specimen: Nares from Nose Updated: 01/28/25 1534     SARS-CoV-2 Negative     INFLUENZA A PCR  Positive     INFLUENZA B PCR Negative     RSV PCR Negative    Narrative:      This test has been performed using the CoV-2/Flu/RSV plus assay on the [employer] GeneXpert platform. This test has been validated by the  and verified by the performing laboratory.     This test is designed to amplify and detect the following: nucleocapsid (N), envelope (E), and RNA-dependent RNA polymerase (RdRP) genes of the SARS-CoV-2 genome; matrix (M), basic polymerase (PB2), and acidic protein (PA) segments of the influenza A genome; matrix (M) and non-structural protein (NS) segments of the influenza B genome, and the nucleocapsid genes of RSV A and RSV B.     Positive results are indicative of the presence of Flu A, Flu B, RSV, and/or SARS-CoV-2 RNA. Positive results for SARS-CoV-2 or suspected novel influenza should be reported to state, local, or federal health departments according to local reporting requirements.      All results should be assessed in conjunction with clinical presentation and other laboratory markers for clinical management.     FOR PEDIATRIC PATIENTS - copy/paste COVID Guidelines URL to browser: [email or web address]               No orders to display       Procedures    ED Medication and Procedure Management   Prior to Admission Medications   Prescriptions Last Dose Informant Patient Reported? Taking?   acetaminophen (TYLENOL) 500 mg tablet   No No   Sig: Take 2 tablets (1,000 mg total) by mouth every 6 (six) hours as needed for mild pain   albuterol (ProAir HFA) 90 mcg/act inhaler   No No   Sig: Inhale 2 puffs every 6 (six) hours as needed for wheezing   buPROPion (WELLBUTRIN) 75 mg tablet   No No   Sig: Take 1 tablet (75 mg total) by mouth 2 (two) times a day for 7 days   ibuprofen (MOTRIN) 800 mg tablet   No No   Sig: Take 1 tablet (800 mg total) by mouth every 8 (eight) hours as needed for mild pain   levETIRAcetam (KEPPRA) 750 mg tablet    Yes No   Sig: Take 750 mg by mouth 2 (two) times a day   levETIRAcetam (Keppra) 750 mg tablet   No No   Sig: Take 1 tablet (750 mg total) by mouth 2 (two) times a day      Facility-Administered Medications: None     Discharge Medication List as of 1/28/2025  4:24 PM        START taking these medications    Details   naproxen (NAPROSYN) 500 mg tablet Take 1 tablet (500 mg total) by mouth 2 (two) times a day with meals, Starting Tue 1/28/2025, Normal      ondansetron (ZOFRAN-ODT) 4 mg disintegrating tablet Take 1 tablet (4 mg total) by mouth every 6 (six) hours as needed for nausea, Starting Tue 1/28/2025, Normal           CONTINUE these medications which have NOT CHANGED    Details   acetaminophen (TYLENOL) 500 mg tablet Take 2 tablets (1,000 mg total) by mouth every 6 (six) hours as needed for mild pain, Starting Sun 8/4/2024, Normal      albuterol (ProAir HFA) 90 mcg/act inhaler Inhale 2 puffs every 6 (six) hours as needed for wheezing, Starting Fri 9/1/2023, Normal      buPROPion (WELLBUTRIN) 75 mg tablet Take 1 tablet (75 mg total) by mouth 2 (two) times a day for 7 days, Starting Mon 9/4/2023, Until Mon 9/11/2023, Normal      ibuprofen (MOTRIN) 800 mg tablet Take 1 tablet (800 mg total) by mouth every 8 (eight) hours as needed for mild pain, Starting Sun 8/4/2024, Normal      !! levETIRAcetam (KEPPRA) 750 mg tablet Take 750 mg by mouth 2 (two) times a day, Historical Med      !! levETIRAcetam (Keppra) 750 mg tablet Take 1 tablet (750 mg total) by mouth 2 (two) times a day, Starting Fri 9/1/2023, Normal       !! - Potential duplicate medications found. Please discuss with provider.        No discharge procedures on file.  ED SEPSIS DOCUMENTATION   Time reflects when diagnosis was documented in both MDM as applicable and the Disposition within this note       Time User Action Codes Description Comment    1/28/2025  3:35 PM Niyah Becerril Add [J10.1] Influenza A     1/28/2025  4:24 PM Niyah Becerril Add  [M79.10] Myalgia     1/28/2025  4:24 PM Niyah Becerril Add [R11.0] Nausea                  Niyah Becerril DO  01/28/25 1745

## 2025-01-28 NOTE — Clinical Note
Alfonso Sauceda was seen and treated in our emergency department on 1/28/2025.                Diagnosis:     Alfonso  may return to work on return date.    He may return on this date: 01/30/2025    May return to work if no fever for 24 hours without the use of Tylenol or ibuprofen     If you have any questions or concerns, please don't hesitate to call.      Niyah Becerril, DO    ______________________________           _______________          _______________  Hospital Representative                              Date                                Time

## 2025-01-30 ENCOUNTER — APPOINTMENT (EMERGENCY)
Dept: RADIOLOGY | Facility: HOSPITAL | Age: 40
End: 2025-01-30
Payer: COMMERCIAL

## 2025-01-30 ENCOUNTER — HOSPITAL ENCOUNTER (EMERGENCY)
Facility: HOSPITAL | Age: 40
Discharge: HOME | End: 2025-01-30
Attending: EMERGENCY MEDICINE
Payer: COMMERCIAL

## 2025-01-30 VITALS
HEART RATE: 70 BPM | RESPIRATION RATE: 16 BRPM | DIASTOLIC BLOOD PRESSURE: 71 MMHG | OXYGEN SATURATION: 99 % | TEMPERATURE: 99.3 F | SYSTOLIC BLOOD PRESSURE: 127 MMHG

## 2025-01-30 DIAGNOSIS — R55 SYNCOPE: Primary | ICD-10-CM

## 2025-01-30 DIAGNOSIS — R07.9 CHEST PAIN: ICD-10-CM

## 2025-01-30 LAB
ANION GAP SERPL CALCULATED.3IONS-SCNC: 9 MMOL/L (ref 4–13)
BASOPHILS # BLD AUTO: 0.02 THOUSANDS/ΜL (ref 0–0.1)
BASOPHILS NFR BLD AUTO: 0 % (ref 0–1)
BUN SERPL-MCNC: 13 MG/DL (ref 5–25)
CALCIUM SERPL-MCNC: 9.1 MG/DL (ref 8.4–10.2)
CARDIAC TROPONIN I PNL SERPL HS: 3 NG/L (ref ?–50)
CHLORIDE SERPL-SCNC: 103 MMOL/L (ref 96–108)
CO2 SERPL-SCNC: 25 MMOL/L (ref 21–32)
CREAT SERPL-MCNC: 0.89 MG/DL (ref 0.6–1.3)
EOSINOPHIL # BLD AUTO: 0.03 THOUSAND/ΜL (ref 0–0.61)
EOSINOPHIL NFR BLD AUTO: 1 % (ref 0–6)
ERYTHROCYTE [DISTWIDTH] IN BLOOD BY AUTOMATED COUNT: 12.8 % (ref 11.6–15.1)
GFR SERPL CREATININE-BSD FRML MDRD: 106 ML/MIN/1.73SQ M
GLUCOSE SERPL-MCNC: 92 MG/DL (ref 65–140)
HCT VFR BLD AUTO: 41.4 % (ref 36.5–49.3)
HGB BLD-MCNC: 14.4 G/DL (ref 12–17)
IMM GRANULOCYTES # BLD AUTO: 0.01 THOUSAND/UL (ref 0–0.2)
IMM GRANULOCYTES NFR BLD AUTO: 0 % (ref 0–2)
LYMPHOCYTES # BLD AUTO: 1.08 THOUSANDS/ΜL (ref 0.6–4.47)
LYMPHOCYTES NFR BLD AUTO: 24 % (ref 14–44)
MCH RBC QN AUTO: 32.4 PG (ref 26.8–34.3)
MCHC RBC AUTO-ENTMCNC: 34.8 G/DL (ref 31.4–37.4)
MCV RBC AUTO: 93 FL (ref 82–98)
MONOCYTES # BLD AUTO: 0.34 THOUSAND/ΜL (ref 0.17–1.22)
MONOCYTES NFR BLD AUTO: 8 % (ref 4–12)
NEUTROPHILS # BLD AUTO: 2.99 THOUSANDS/ΜL (ref 1.85–7.62)
NEUTS SEG NFR BLD AUTO: 67 % (ref 43–75)
NRBC BLD AUTO-RTO: 0 /100 WBCS
PLATELET # BLD AUTO: 175 THOUSANDS/UL (ref 149–390)
PMV BLD AUTO: 10.6 FL (ref 8.9–12.7)
POTASSIUM SERPL-SCNC: 3.6 MMOL/L (ref 3.5–5.3)
RBC # BLD AUTO: 4.44 MILLION/UL (ref 3.88–5.62)
SODIUM SERPL-SCNC: 137 MMOL/L (ref 135–147)
WBC # BLD AUTO: 4.47 THOUSAND/UL (ref 4.31–10.16)

## 2025-01-30 PROCEDURE — 80048 BASIC METABOLIC PNL TOTAL CA: CPT

## 2025-01-30 PROCEDURE — 85025 COMPLETE CBC W/AUTO DIFF WBC: CPT

## 2025-01-30 PROCEDURE — 99285 EMERGENCY DEPT VISIT HI MDM: CPT

## 2025-01-30 PROCEDURE — 36415 COLL VENOUS BLD VENIPUNCTURE: CPT

## 2025-01-30 PROCEDURE — 99285 EMERGENCY DEPT VISIT HI MDM: CPT | Performed by: EMERGENCY MEDICINE

## 2025-01-30 PROCEDURE — 71045 X-RAY EXAM CHEST 1 VIEW: CPT

## 2025-01-30 PROCEDURE — 84484 ASSAY OF TROPONIN QUANT: CPT

## 2025-01-30 PROCEDURE — 93005 ELECTROCARDIOGRAM TRACING: CPT

## 2025-01-31 LAB
ATRIAL RATE: 76 BPM
P AXIS: 84 DEGREES
PR INTERVAL: 182 MS
QRS AXIS: -85 DEGREES
QRSD INTERVAL: 88 MS
QT INTERVAL: 378 MS
QTC INTERVAL: 425 MS
T WAVE AXIS: 69 DEGREES
VENTRICULAR RATE: 76 BPM

## 2025-01-31 PROCEDURE — 93010 ELECTROCARDIOGRAM REPORT: CPT | Performed by: INTERNAL MEDICINE

## 2025-01-31 NOTE — ED PROVIDER NOTES
Time reflects when diagnosis was documented in both MDM as applicable and the Disposition within this note       Time User Action Codes Description Comment    1/30/2025  8:42 PM Garett Corona Add [R55] Syncope     1/30/2025  8:43 PM Garett Corona Add [R07.9] Chest pain           ED Disposition       ED Disposition   Discharge    Condition   Stable    Date/Time   Thu Jan 30, 2025  8:42 PM    Comment   Alfonso Fleminggeetha Sauceda discharge to home/self care.                   Assessment & Plan       Medical Decision Making  Patient well-appearing on exam, GCS 15 AO x 4.  Patient is adamant that this event was not a seizure because it does not like what happened to him in the past.  He says he was passed out for a matter of about 10 seconds and was fully alert when he woke up.  He says in the past he urinated on himself and had muscle cramping after seizure which he had none of today.  He believes that he was very worked up from talking on the phone with his daughter and it caused him to breathe too fast and passed out.  The chest pain that he has radiates to his left arm and does not radiate anywhere else.  It does not go to his back.  This pain started at 8 AM and has no other associated symptoms with it.  Pulses in both arms are equal.  On exam no signs of trauma he does have some midline C-spine tenderness.  I emphasized to the patient that I would like to do a CT scan of his brain and spine but he declined.  I had an extensive conversation with the patient regarding risk and benefits of this and he still declines imaging.  I was able to get him to agree to basic labs and a chest x-ray.  Labs, EKG and chest x-ray do not point to a specific cause of his symptoms today.  He says he feels completely back to normal now.  I gave him strict return precautions and instructions to follow-up with the family doctor as soon as possible to which he is agreeable.    Differential diagnosis includes but is not limited to:  Electrolyte abnormality, arrhythmia, orthostatic hypotension, anxiety, brain bleed, cervical fracture, seizure    Given the report of the episode today from the patient, paramedics and nurse think it is unlikely that this was a seizure given its short time course and his immediate return to full consciousness afterwards.    Patient denies any additional symptoms on direct questioning except those explicitly noted in the HPI and ROS.     Triage note was reviewed and patient asked directly about concerns mentioned in triage note.     I will order appropriate testing to narrow my differential    Unless otherwise noted:  - There is no language barrier  - Chart was reviewed   - Labs and imaging were reviewed    Amount and/or Complexity of Data Reviewed  Labs: ordered.  Radiology: ordered and independent interpretation performed.        ED Course as of 01/30/25 2219   Thu Jan 30, 2025 1931 Offered head CT and cervical spine CT the patient which he declines.  I described to him the risk of us missing a unstable cervical spine fracture as well as a area of bleeding in his brain which could have devastating neurological consequences in the future however he continues to decline imaging stating he wishes to go home.   2039 Labs without any significant abnormalities.  Troponin of 3 acceptable given that patient's chest pain has been the same and without any change since this morning at 8 AM   2045 EKG interpretation by me.  Normal sinus rhythm rate 76.  Left axis deviation.  No ST segment elevation or depressions.  Normal DE, QRS, QT intervals.       Medications - No data to display    ED Risk Strat Scores   HEART Risk Score      Flowsheet Row Most Recent Value   Heart Score Risk Calculator    History 1 Filed at: 01/30/2025 2044   ECG 1 Filed at: 01/30/2025 2044   Age 0 Filed at: 01/30/2025 2044   Risk Factors 1 Filed at: 01/30/2025 2044   Troponin 0 Filed at: 01/30/2025 2044   HEART Score 3 Filed at: 01/30/2025 2044       "    HEART Risk Score      Flowsheet Row Most Recent Value   Heart Score Risk Calculator    History 1 Filed at: 01/30/2025 2044   ECG 1 Filed at: 01/30/2025 2044   Age 0 Filed at: 01/30/2025 2044   Risk Factors 1 Filed at: 01/30/2025 2044   Troponin 0 Filed at: 01/30/2025 2044   HEART Score 3 Filed at: 01/30/2025 2044                                                History of Present Illness       Chief Complaint   Patient presents with    Syncope     Patient reporting witnessed syncopal episode about an hour ago by bystanders. Reporting he was told that he \"fell to the ground and hit head\". States does have hx of seizures and has not been taking meds but he knows he didn't have a seizure because \"I didn't pee on myself\". Patient also reporting midsternal chest pressure all day. + headstrike during fall, complaining of left sided head pain.       Past Medical History:   Diagnosis Date    Epilepsy (HCC)       History reviewed. No pertinent surgical history.   History reviewed. No pertinent family history.   Social History     Tobacco Use    Smoking status: Every Day     Types: Cigarettes, Cigars    Smokeless tobacco: Never   Vaping Use    Vaping status: Never Used   Substance Use Topics    Alcohol use: Yes     Comment: occ    Drug use: Yes     Frequency: 4.0 times per week     Types: Marijuana      E-Cigarette/Vaping    E-Cigarette Use Never User       E-Cigarette/Vaping Substances      I have reviewed and agree with the history as documented.     Patient is a 39-year-old male with a past medical history of seizure disorder, not taking his antiseizure medications, today was having a heated argument with his daughter on the phone and felt like he was breathing fast and felt short of breath and then next thing he knows remembers being on the ground looking up.  He says he did not urinate on himself and did not bite his tongue which he has had done with his past seizures.  He says he was fully alert when he woke up.  He " endorses some neck pain.  He denies headache or vomiting.  He endorses chest pain that radiates to his left arm that started at 8:00 this morning.  He denies fever, chills, cough, shortness of breath, nausea, vomiting, diarrhea.        Review of Systems   Constitutional:  Negative for chills and fever.   HENT:  Negative for ear pain and sore throat.    Eyes:  Negative for pain and visual disturbance.   Respiratory:  Negative for cough and shortness of breath.    Cardiovascular:  Positive for chest pain. Negative for palpitations and leg swelling.   Gastrointestinal:  Negative for abdominal pain and vomiting.   Genitourinary:  Negative for dysuria and hematuria.   Musculoskeletal:  Negative for arthralgias and back pain.   Skin:  Negative for color change and rash.   Neurological:  Positive for syncope. Negative for dizziness, tremors, seizures, facial asymmetry, speech difficulty, weakness, light-headedness, numbness and headaches.   All other systems reviewed and are negative.          Objective       ED Triage Vitals   Temperature Pulse Blood Pressure Respirations SpO2 Patient Position - Orthostatic VS   01/30/25 1829 01/30/25 1829 01/30/25 1829 01/30/25 1829 01/30/25 1829 01/30/25 2030   99.3 °F (37.4 °C) 74 134/79 18 99 % Lying      Temp Source Heart Rate Source BP Location FiO2 (%) Pain Score    01/30/25 1829 -- 01/30/25 2030 -- --    Oral  Right arm        Vitals      Date and Time Temp Pulse SpO2 Resp BP Pain Score FACES Pain Rating User   01/30/25 2031 -- -- -- 16 -- -- -- NM   01/30/25 2030 -- 70 99 % 23 127/71 -- -- CZ   01/30/25 1829 99.3 °F (37.4 °C) 74 99 % 18 134/79 -- -- SL            Physical Exam  Constitutional:       General: He is not in acute distress.     Appearance: Normal appearance. He is normal weight. He is not ill-appearing, toxic-appearing or diaphoretic.   HENT:      Head: Normocephalic and atraumatic.      Right Ear: Tympanic membrane, ear canal and external ear normal. There is no  impacted cerumen.      Left Ear: Tympanic membrane, ear canal and external ear normal. There is no impacted cerumen.      Nose: Nose normal.      Mouth/Throat:      Mouth: Mucous membranes are dry.      Pharynx: Oropharynx is clear. No oropharyngeal exudate or posterior oropharyngeal erythema.   Eyes:      General: No scleral icterus.        Right eye: No discharge.         Left eye: No discharge.      Extraocular Movements: Extraocular movements intact.      Conjunctiva/sclera: Conjunctivae normal.      Pupils: Pupils are equal, round, and reactive to light.   Cardiovascular:      Rate and Rhythm: Normal rate and regular rhythm.      Pulses: Normal pulses.      Heart sounds: Normal heart sounds. No murmur heard.     No friction rub. No gallop.   Pulmonary:      Effort: Pulmonary effort is normal. No respiratory distress.      Breath sounds: Normal breath sounds. No stridor. No wheezing, rhonchi or rales.   Chest:      Chest wall: No tenderness.   Abdominal:      General: Abdomen is flat. Bowel sounds are normal. There is no distension.      Palpations: Abdomen is soft. There is no mass.      Tenderness: There is no abdominal tenderness. There is no guarding or rebound.      Hernia: No hernia is present.   Musculoskeletal:      Cervical back: Normal range of motion and neck supple. No rigidity.      Comments: Midline cervical spine tenderness.  No T or L-spine tenderness.   Lymphadenopathy:      Cervical: No cervical adenopathy.   Skin:     General: Skin is warm and dry.      Capillary Refill: Capillary refill takes less than 2 seconds.   Neurological:      General: No focal deficit present.      Mental Status: He is alert and oriented to person, place, and time.         Results Reviewed       Procedure Component Value Units Date/Time    HS Troponin 0hr (reflex protocol) [[id number]]  (Normal) Collected: 01/30/25 1958    Lab Status: Final result Specimen: Blood from Arm, Right Updated: 01/30/25 2031     hs TnI 0hr 3  ng/L     Basic metabolic panel [[id number]] Collected: 01/30/25 1958    Lab Status: Final result Specimen: Blood from Arm, Right Updated: 01/30/25 2027     Sodium 137 mmol/L      Potassium 3.6 mmol/L      Chloride 103 mmol/L      CO2 25 mmol/L      ANION GAP 9 mmol/L      BUN 13 mg/dL      Creatinine 0.89 mg/dL      Glucose 92 mg/dL      Calcium 9.1 mg/dL      eGFR 106 ml/min/1.73sq m     Narrative:      [employer] guidelines for Chronic Kidney Disease (CKD):     Stage 1 with normal or high GFR (GFR > 90 mL/min/1.73 square meters)    Stage 2 Mild CKD (GFR = 60-89 mL/min/1.73 square meters)    Stage 3A Moderate CKD (GFR = 45-59 mL/min/1.73 square meters)    Stage 3B Moderate CKD (GFR = 30-44 mL/min/1.73 square meters)    Stage 4 Severe CKD (GFR = 15-29 mL/min/1.73 square meters)    Stage 5 End Stage CKD (GFR <15 mL/min/1.73 square meters)  Note: GFR calculation is accurate only with a steady state creatinine    CBC and differential [[id number]] Collected: 01/30/25 1958    Lab Status: Final result Specimen: Blood from Arm, Right Updated: 01/30/25 2005     WBC 4.47 Thousand/uL      RBC 4.44 Million/uL      Hemoglobin 14.4 g/dL      Hematocrit 41.4 %      MCV 93 fL      MCH 32.4 pg      MCHC 34.8 g/dL      RDW 12.8 %      MPV 10.6 fL      Platelets 175 Thousands/uL      nRBC 0 /100 WBCs      Segmented % 67 %      Immature Grans % 0 %      Lymphocytes % 24 %      Monocytes % 8 %      Eosinophils Relative 1 %      Basophils Relative 0 %      Absolute Neutrophils 2.99 Thousands/µL      Absolute Immature Grans 0.01 Thousand/uL      Absolute Lymphocytes 1.08 Thousands/µL      Absolute Monocytes 0.34 Thousand/µL      Eosinophils Absolute 0.03 Thousand/µL      Basophils Absolute 0.02 Thousands/µL             XR chest portable   ED Interpretation by Garett Corona MD (01/30 2022)   No acute cardiopulmonary abnormality          Procedures    ED Medication and Procedure Management   Prior to Admission  Medications   Prescriptions Last Dose Informant Patient Reported? Taking?   acetaminophen (TYLENOL) 500 mg tablet   No No   Sig: Take 2 tablets (1,000 mg total) by mouth every 6 (six) hours as needed for mild pain   albuterol (ProAir HFA) 90 mcg/act inhaler   No No   Sig: Inhale 2 puffs every 6 (six) hours as needed for wheezing   buPROPion (WELLBUTRIN) 75 mg tablet   No No   Sig: Take 1 tablet (75 mg total) by mouth 2 (two) times a day for 7 days   ibuprofen (MOTRIN) 800 mg tablet   No No   Sig: Take 1 tablet (800 mg total) by mouth every 8 (eight) hours as needed for mild pain   levETIRAcetam (KEPPRA) 750 mg tablet   Yes No   Sig: Take 750 mg by mouth 2 (two) times a day   levETIRAcetam (Keppra) 750 mg tablet   No No   Sig: Take 1 tablet (750 mg total) by mouth 2 (two) times a day   naproxen (NAPROSYN) 500 mg tablet   No No   Sig: Take 1 tablet (500 mg total) by mouth 2 (two) times a day with meals   ondansetron (ZOFRAN-ODT) 4 mg disintegrating tablet   No No   Sig: Take 1 tablet (4 mg total) by mouth every 6 (six) hours as needed for nausea      Facility-Administered Medications: None     Discharge Medication List as of 1/30/2025  8:45 PM        CONTINUE these medications which have NOT CHANGED    Details   acetaminophen (TYLENOL) 500 mg tablet Take 2 tablets (1,000 mg total) by mouth every 6 (six) hours as needed for mild pain, Starting Sun 8/4/2024, Normal      albuterol (ProAir HFA) 90 mcg/act inhaler Inhale 2 puffs every 6 (six) hours as needed for wheezing, Starting Fri 9/1/2023, Normal      buPROPion (WELLBUTRIN) 75 mg tablet Take 1 tablet (75 mg total) by mouth 2 (two) times a day for 7 days, Starting Mon 9/4/2023, Until Mon 9/11/2023, Normal      ibuprofen (MOTRIN) 800 mg tablet Take 1 tablet (800 mg total) by mouth every 8 (eight) hours as needed for mild pain, Starting Sun 8/4/2024, Normal      !! levETIRAcetam (KEPPRA) 750 mg tablet Take 750 mg by mouth 2 (two) times a day, Historical Med      !!  levETIRAcetam (Keppra) 750 mg tablet Take 1 tablet (750 mg total) by mouth 2 (two) times a day, Starting Fri 9/1/2023, Normal      naproxen (NAPROSYN) 500 mg tablet Take 1 tablet (500 mg total) by mouth 2 (two) times a day with meals, Starting Tue 1/28/2025, Normal      ondansetron (ZOFRAN-ODT) 4 mg disintegrating tablet Take 1 tablet (4 mg total) by mouth every 6 (six) hours as needed for nausea, Starting Tue 1/28/2025, Normal       !! - Potential duplicate medications found. Please discuss with provider.          ED SEPSIS DOCUMENTATION   Time reflects when diagnosis was documented in both MDM as applicable and the Disposition within this note       Time User Action Codes Description Comment    1/30/2025  8:42 PM Garett Corona [R55] Syncope     1/30/2025  8:43 PM Garett Corona [R07.9] Chest pain                  Garett Corona MD  01/30/25 [id number]

## 2025-01-31 NOTE — DISCHARGE INSTRUCTIONS
Hello you were seen today for passing out and chest pain    Workup here does not reveal a cause of your chest pain    Please follow-up with your family doctor, if you do not have a family doctor I sent a referral to  [facility] she can call them to make an appointment their numbers on this paper    Please return to the emergency room if you develop a headache, vision changes, vomiting, worsening chest pain, shortness of breath, passing out again, seizures, fever, chills, any new symptoms

## 2025-02-06 NOTE — ED ATTENDING ATTESTATION
"1/30/2025  I, Tung Franklin MD, saw and evaluated the patient. I have discussed the patient with the resident/non-physician practitioner and agree with the resident's/non-physician practitioner's findings, Plan of Care, and MDM as documented in the resident's/non-physician practitioner's note, except where noted. All available labs and Radiology studies were reviewed.  I was present for key portions of any procedure(s) performed by the resident/non-physician practitioner and I was immediately available to provide assistance.       At this point I agree with the current assessment done in the Emergency Department.  I have conducted an independent evaluation of this patient a history and physical is as follows:    ED Course     3-year-old male here for evaluation of a transient alteration of consciousness.  Patient does have a history of seizure disorder but states symptoms today were nothing like past seizures.  Says he was on the phone with his daughter and was getting very emotional.  Reports hyperventilating and feeling palpitations and sensation of racing thoughts, mild dyspnea and felt lightheaded.  Shortly thereafter woke up on the ground looking up.  Denies any postictal period, loss of bladder control or tongue biting which have been typical of previous seizures.  Currently asymptomatic.  Denies chest pain, shortness of breath, palpitations.  States \"I think I just got myself worked up.\"  Denies recent fever/illnesses, no rashes.  No headache or neck pain/stiffness.    On exam GCS 15 nonfocal, sclera nonicteric conjunctive normal, regular rate and rhythm pulm clear to auscultation bilaterally, abdomen soft nondistended nontender, extremities nontender without edema, normal distal sensation and cap refill.    Impression and plan: Transient loss of consciousness most likely syncopal episode brought on by emotional distress.  Will check labs and EKG, monitor on telemetry, reassess.    Critical Care " Time  Procedures

## 2025-04-24 ENCOUNTER — HOSPITAL ENCOUNTER (EMERGENCY)
Facility: HOSPITAL | Age: 40
Discharge: HOME | End: 2025-04-24
Attending: EMERGENCY MEDICINE
Payer: COMMERCIAL

## 2025-04-24 VITALS
HEART RATE: 66 BPM | TEMPERATURE: 98.5 F | OXYGEN SATURATION: 98 % | RESPIRATION RATE: 20 BRPM | SYSTOLIC BLOOD PRESSURE: 135 MMHG | WEIGHT: 171.3 LBS | BODY MASS INDEX: 22.6 KG/M2 | DIASTOLIC BLOOD PRESSURE: 82 MMHG

## 2025-04-24 DIAGNOSIS — A63.0 CONDYLOMA ACUMINATUM OF PENIS: Primary | ICD-10-CM

## 2025-04-24 LAB
BACTERIA UR QL AUTO: NORMAL /HPF
BILIRUB UR QL STRIP: NEGATIVE
CLARITY UR: CLEAR
COLOR UR: ABNORMAL
GLUCOSE UR STRIP-MCNC: NEGATIVE MG/DL
HGB UR QL STRIP.AUTO: 10
KETONES UR STRIP-MCNC: NEGATIVE MG/DL
LEUKOCYTE ESTERASE UR QL STRIP: 25
NITRITE UR QL STRIP: NEGATIVE
NON-SQ EPI CELLS URNS QL MICRO: NORMAL /HPF
PH UR STRIP.AUTO: 6 [PH]
PROT UR STRIP-MCNC: ABNORMAL MG/DL
RBC #/AREA URNS AUTO: NORMAL /HPF
SP GR UR STRIP.AUTO: 1.02 (ref 1–1.04)
UROBILINOGEN UA: 4 MG/DL
WBC #/AREA URNS AUTO: NORMAL /HPF

## 2025-04-24 PROCEDURE — 81001 URINALYSIS AUTO W/SCOPE: CPT | Performed by: EMERGENCY MEDICINE

## 2025-04-24 PROCEDURE — 87591 N.GONORRHOEAE DNA AMP PROB: CPT | Performed by: EMERGENCY MEDICINE

## 2025-04-24 PROCEDURE — 87491 CHLMYD TRACH DNA AMP PROBE: CPT | Performed by: EMERGENCY MEDICINE

## 2025-04-24 PROCEDURE — 99284 EMERGENCY DEPT VISIT MOD MDM: CPT | Performed by: EMERGENCY MEDICINE

## 2025-04-24 PROCEDURE — 99283 EMERGENCY DEPT VISIT LOW MDM: CPT

## 2025-04-24 RX ORDER — PODOFILOX 5 MG/ML
SOLUTION TOPICAL 2 TIMES DAILY
Qty: 3.5 ML | Refills: 0 | Status: SHIPPED | OUTPATIENT
Start: 2025-04-24

## 2025-04-25 LAB
C TRACH DNA SPEC QL NAA+PROBE: NEGATIVE
N GONORRHOEA DNA SPEC QL NAA+PROBE: NEGATIVE

## 2025-04-25 NOTE — DISCHARGE INSTRUCTIONS
Patient Education     Anogenital Warts Discharge Instructions   About this topic   Genital warts are a sexually transmitted disease or STD. They are caused by an infection with the human papillomavirus (HPV). These warts are soft, moist, and pink or flesh colored. They are growths or bumps in the genital area. They may look like a skin tag, a group of bumps, or a small mass that looks like cauliflower. Genital warts may appear any place in your genital area. They can also show up in your mouth or throat. You can get the infection during oral, vaginal, or anal sex with an infected sex partner. There are often no signs of illness. The warts may bleed, itch, or burn when rubbed or irritated.  There are many ways to treat genital warts. Your doctor may give you drugs to take or to spread on the skin. Other times, the wart is treated with laser treatment or freezing. Sometimes, surgery is needed to remove the wart. Some warts go away without any treatment. Warts may spread to other areas of the skin. This may happen even after they have been removed. Getting rid of the wart does not get rid of the virus that caused it. You may get a wart in a new spot or back in the same spot.     What care is needed at home?   Ask your doctor what you need to do when you go home. Make sure you ask questions if you do not understand what the doctor says.  Avoid sexual contact until treatment is finished and the doctor says you will not spread the infection to anyone else.  Take lukewarm baths. Sit in a warm bath 3 to 4 times a day. This may help ease itching and pain. Do not rub the area. Instead, pat the area dry with a clean towel.  Do not touch or scratch the warts. This may spread the infection to other parts of your body.  Only use drugs given to you by your doctor to treat your warts. Drugs to treat common warts and foot warts cannot be used on genital warts.  If you had surgery, ask your doctor about:  How often you should change  your dressing. You may need to wear a sanitary napkin or gauze. You may have some bleeding or drainage.  If your cut site is bleeding or swells up, use steady pressure on the area for a few minutes.  Ask your doctor when to bathe, shower, or soak in the water.  What follow-up care is needed?   Your doctor may ask you to make visits to the office to check on your progress. Be sure to keep these visits.  Women may need to have regular Pap smear tests more often. Talk to your doctor about the right screening schedule for you.  You may want to join a support group. Getting help and support may help you deal with your infection. Talking with others who have the same infection may help you feel better.  What drugs may be needed?   The doctor may order drugs to:  Treat HPV infection  Help with pain and swelling  Relieve itching  Will physical activity be limited?   Physical activity may not be limited.  What problems could happen?   Certain types of HPV may lead to cervical cancer and other cancers in the affected area.  Pregnant women may pass the infection to their baby at birth. They may also have trouble giving birth. The warts may get larger during pregnancy. Warts in or near the vaginal opening may block the birth canal during delivery. This may also cause you to have trouble passing urine.  What can be done to prevent this health problem?   The only sure way to keep from getting or passing on a sexually transmitted infection is to not have sexual contact with anyone. This infection may be spread even if you do not have any signs of illness.  Get vaccinated against HPV. This can protect you against any type of HPV and cervical cancer.  Avoid contact with any sex partner known to have the infection.  If you have sex, use latex condoms to reduce spread of infection.  Stop smoking. Smokers have a higher risk to have genital warts. This may also trigger the genital warts to come back. Ask for help if you have problems  quitting.  When do I need to call the doctor?   Signs of infection. These include a fever of 100.4°F (38°C) or higher, chills, very bad sore throat, pain with passing urine, mouth sores, a wound that will not heal, or anal itching or pain.  Signs of wound infection. These include swelling, redness, warmth around the wound; too much pain when touched; yellowish, greenish, or bloody discharge; foul smell coming from the wound.  Teach Back: Helping You Understand   The Teach Back Method helps you understand the information we are giving you. After you talk with the staff, tell them in your own words what you learned. This helps to make sure the staff has described each thing clearly. It also helps to explain things that may have been confusing. Before going home, make sure you can do these:  I can tell you about my condition.  I can tell you how to avoid passing the infection on to others.  I can tell you what I will do if I have swelling, redness, warmth, or pain around my wound.  Last Reviewed Date   2021-03-09  Consumer Information Use and Disclaimer   This generalized information is a limited summary of diagnosis, treatment, and/or medication information. It is not meant to be comprehensive and should be used as a tool to help the user understand and/or assess potential diagnostic and treatment options. It does NOT include all information about conditions, treatments, medications, side effects, or risks that may apply to a specific patient. It is not intended to be medical advice or a substitute for the medical advice, diagnosis, or treatment of a health care provider based on the health care provider's examination and assessment of a patient’s specific and unique circumstances. Patients must speak with a health care provider for complete information about their health, medical questions, and treatment options, including any risks or benefits regarding use of medications. This information does not endorse any  treatments or medications as safe, effective, or approved for treating a specific patient. [employer] and its affiliates disclaim any warranty or liability relating to this information or the use thereof. The use of this information is governed by the Terms of Use, available at https://www.[employer].com/en/know/clinical-effectiveness-terms   Copyright   Copyright © 2024 [employer] and its affiliates and/or licensors. All rights reserved.

## 2025-04-25 NOTE — ED PROVIDER NOTES
Time reflects when diagnosis was documented in both MDM as applicable and the Disposition within this note       Time User Action Codes Description Comment    4/24/2025 11:07 PM Check, Claude Add [A63.0] Condyloma acuminatum of penis           ED Disposition       ED Disposition   Discharge    Condition   Stable    Date/Time   Thu Apr 24, 2025 11:11 PM    Comment   Marcus Tremayne Williams discharge to home/self care.                   Assessment & Plan       Medical Decision Making  39-year-old male presents with painless lesions on the shaft of his penis.  On exam his rest comfortably but in no acute distress.  Differential diagnosis includes but is not limited to herpes, genital warts, gonorrhea, chlamydia.    Clinically symptoms appear to be secondary to HPV.  Will send off urine to test for gonorrhea and chlamydia as he does have unprotected sex.  No indication for empiric treatment at this time.    Will give him prescription for Condylox and information to follow-up with dermatology.  He was given strict turn precautions and was in agreement with the plan.    Problems Addressed:  Condyloma acuminatum of penis: acute illness or injury    Amount and/or Complexity of Data Reviewed  Labs: ordered.    Risk  Prescription drug management.             Medications - No data to display    ED Risk Strat Scores                    No data recorded        SBIRT 20yo+      Flowsheet Row Most Recent Value   Initial Alcohol Screen: US AUDIT-C     1. How often do you have a drink containing alcohol? 1 Filed at: 04/24/2025 2258   2. How many drinks containing alcohol do you have on a typical day you are drinking?  1 Filed at: 04/24/2025 2258   3a. Male UNDER 65: How often do you have five or more drinks on one occasion? 0 Filed at: 04/24/2025 2258   3b. FEMALE Any Age, or MALE 65+: How often do you have 4 or more drinks on one occassion? 0 Filed at: 04/24/2025 2258   Audit-C Score 2 Filed at: 04/24/2025 2258   [place]: How many  times in the past year have you...    Used an illegal drug or used a prescription medication for non-medical reasons? Never Filed at: 04/24/2025 1124                            History of Present Illness       Chief Complaint   Patient presents with    Penis / Scrotum Problem     Pt reports for a few weeks he has had a few painless bumps surrounding his penis, and today noticed one on his penis. Reports he is sexually active, and does not always use protection, has concern for an STD. Denies discharge or problems with voiding.       Past Medical History:   Diagnosis Date    Epilepsy (HCC)       History reviewed. No pertinent surgical history.   History reviewed. No pertinent family history.   Social History     Tobacco Use    Smoking status: Every Day     Types: Cigars    Smokeless tobacco: Never   Vaping Use    Vaping status: Every Day    Substances: Nicotine, Flavoring   Substance Use Topics    Alcohol use: Yes     Comment: occ    Drug use: Yes     Frequency: 4.0 times per week     Types: Marijuana      E-Cigarette/Vaping    E-Cigarette Use Current Every Day User       E-Cigarette/Vaping Substances    Nicotine Yes     Flavoring Yes       I have reviewed and agree with the history as documented.     39-year-old male presents to the emergency department for evaluation of multiple painless bumps on the shaft of his penis.  He states he is sexually active, and does not always use protection.  He denies any dysuria or hematuria.  No penile discharge.  No testicular pain, abdominal pain, nausea or vomiting.  He states he first noticed these bumps a few days ago.        Review of Systems   Constitutional:  Negative for chills and fever.   HENT:  Negative for ear pain and sore throat.    Eyes:  Negative for pain and visual disturbance.   Respiratory:  Negative for cough and shortness of breath.    Cardiovascular:  Negative for chest pain and palpitations.   Gastrointestinal:  Negative for abdominal pain and vomiting.    Genitourinary:  Negative for dysuria and hematuria.        Penile lesion   Musculoskeletal:  Negative for arthralgias and back pain.   Skin:  Negative for color change and rash.   Neurological:  Negative for seizures and syncope.   All other systems reviewed and are negative.          Objective       ED Triage Vitals [04/24/25 2256]   Temperature Pulse Blood Pressure Respirations SpO2 Patient Position - Orthostatic VS   98.5 °F (36.9 °C) 66 135/82 20 98 % Sitting      Temp Source Heart Rate Source BP Location FiO2 (%) Pain Score    Oral Monitor Left arm -- --      Vitals      Date and Time Temp Pulse SpO2 Resp BP Pain Score FACES Pain Rating User   04/24/25 2256 98.5 °F (36.9 °C) 66 98 % 20 135/82 -- -- RS            Physical Exam  Vitals and nursing note reviewed.   Constitutional:       General: He is not in acute distress.  HENT:      Head: Normocephalic and atraumatic.      Right Ear: External ear normal.      Left Ear: External ear normal.      Nose: Nose normal.      Mouth/Throat:      Mouth: Mucous membranes are moist.   Eyes:      Extraocular Movements: Extraocular movements intact.      Conjunctiva/sclera: Conjunctivae normal.      Pupils: Pupils are equal, round, and reactive to light.   Cardiovascular:      Rate and Rhythm: Normal rate and regular rhythm.      Pulses: Normal pulses.   Pulmonary:      Effort: Pulmonary effort is normal. No respiratory distress.      Breath sounds: No stridor.   Genitourinary:     Penis: Circumcised. Lesions present.       Testes: Normal.         Right: Tenderness or swelling not present.         Left: Tenderness or swelling not present.      Comments: Nontender lesions noted on the shaft of the penis consistent with condyloma acuminata  Musculoskeletal:         General: No deformity. Normal range of motion.      Cervical back: Normal range of motion and neck supple.   Lymphadenopathy:      Lower Body: No right inguinal adenopathy. No left inguinal adenopathy.   Skin:      General: Skin is warm and dry.      Capillary Refill: Capillary refill takes less than 2 seconds.   Neurological:      General: No focal deficit present.      Mental Status: He is alert and oriented to person, place, and time.   Psychiatric:         Mood and Affect: Mood normal.         Behavior: Behavior normal.         Results Reviewed       Procedure Component Value Units Date/Time    UA (URINE) with reflex to Scope [[id number]] Collected: 04/24/25 2315    Lab Status: No result Specimen: Urine, Other     Chlamydia/GC amplified DNA by PCR [[id number]] Collected: 04/24/25 2315    Lab Status: No result Specimen: Urine, Other             No orders to display       Procedures    ED Medication and Procedure Management   Prior to Admission Medications   Prescriptions Last Dose Informant Patient Reported? Taking?   acetaminophen (TYLENOL) 500 mg tablet   No No   Sig: Take 2 tablets (1,000 mg total) by mouth every 6 (six) hours as needed for mild pain   albuterol (ProAir HFA) 90 mcg/act inhaler   No No   Sig: Inhale 2 puffs every 6 (six) hours as needed for wheezing   buPROPion (WELLBUTRIN) 75 mg tablet   No No   Sig: Take 1 tablet (75 mg total) by mouth 2 (two) times a day for 7 days   ibuprofen (MOTRIN) 800 mg tablet   No No   Sig: Take 1 tablet (800 mg total) by mouth every 8 (eight) hours as needed for mild pain   levETIRAcetam (KEPPRA) 750 mg tablet   Yes No   Sig: Take 750 mg by mouth 2 (two) times a day   levETIRAcetam (Keppra) 750 mg tablet   No No   Sig: Take 1 tablet (750 mg total) by mouth 2 (two) times a day   naproxen (NAPROSYN) 500 mg tablet   No No   Sig: Take 1 tablet (500 mg total) by mouth 2 (two) times a day with meals   ondansetron (ZOFRAN-ODT) 4 mg disintegrating tablet   No No   Sig: Take 1 tablet (4 mg total) by mouth every 6 (six) hours as needed for nausea      Facility-Administered Medications: None     Patient's Medications   Discharge Prescriptions    PODOFILOX (CONDYLOX) 0.5 % EXTERNAL SOLUTION     Apply topically 2 (two) times a day Apply topically twice daily for 3 days, then discontinue for 4 consecutive days.  This cycle may be repeated until symptoms resolve       Start Date: 4/24/2025 End Date: --       Order Dose: --       Quantity: 3.5 mL    Refills: 0     No discharge procedures on file.  ED SEPSIS DOCUMENTATION   Time reflects when diagnosis was documented in both MDM as applicable and the Disposition within this note       Time User Action Codes Description Comment    4/24/2025 11:07 PM Claude Booth Add [A63.0] Condyloma acuminatum of penis                  Claude Booth MD  04/24/25 3228

## 2025-05-19 ENCOUNTER — APPOINTMENT (EMERGENCY)
Dept: CT IMAGING | Facility: HOSPITAL | Age: 40
End: 2025-05-19
Payer: COMMERCIAL

## 2025-05-19 ENCOUNTER — HOSPITAL ENCOUNTER (EMERGENCY)
Facility: HOSPITAL | Age: 40
Discharge: HOME | End: 2025-05-19
Payer: COMMERCIAL

## 2025-05-19 VITALS
TEMPERATURE: 98 F | WEIGHT: 171.74 LBS | OXYGEN SATURATION: 97 % | RESPIRATION RATE: 14 BRPM | HEART RATE: 62 BPM | SYSTOLIC BLOOD PRESSURE: 127 MMHG | BODY MASS INDEX: 22.66 KG/M2 | DIASTOLIC BLOOD PRESSURE: 85 MMHG

## 2025-05-19 DIAGNOSIS — M54.50 ACUTE LOW BACK PAIN: Primary | ICD-10-CM

## 2025-05-19 DIAGNOSIS — R93.89 ABNORMAL CT SCAN: ICD-10-CM

## 2025-05-19 LAB
ALBUMIN SERPL BCG-MCNC: 4.1 G/DL (ref 3.5–5)
ALP SERPL-CCNC: 71 U/L (ref 34–104)
ALT SERPL W P-5'-P-CCNC: 20 U/L (ref 7–52)
ANION GAP SERPL CALCULATED.3IONS-SCNC: 6 MMOL/L (ref 4–13)
AST SERPL W P-5'-P-CCNC: 28 U/L (ref 13–39)
BACTERIA UR QL AUTO: ABNORMAL /HPF
BASOPHILS # BLD AUTO: 0.03 THOUSANDS/ÂΜL (ref 0–0.1)
BASOPHILS NFR BLD AUTO: 1 % (ref 0–1)
BILIRUB SERPL-MCNC: 0.44 MG/DL (ref 0.2–1)
BILIRUB UR QL STRIP: NEGATIVE
BUN SERPL-MCNC: 14 MG/DL (ref 5–25)
CALCIUM SERPL-MCNC: 9.2 MG/DL (ref 8.4–10.2)
CHLORIDE SERPL-SCNC: 104 MMOL/L (ref 96–108)
CLARITY UR: CLEAR
CO2 SERPL-SCNC: 27 MMOL/L (ref 21–32)
COLOR UR: YELLOW
CREAT SERPL-MCNC: 0.93 MG/DL (ref 0.6–1.3)
EOSINOPHIL # BLD AUTO: 0.13 THOUSAND/ÂΜL (ref 0–0.61)
EOSINOPHIL NFR BLD AUTO: 3 % (ref 0–6)
ERYTHROCYTE [DISTWIDTH] IN BLOOD BY AUTOMATED COUNT: 13.4 % (ref 11.6–15.1)
GFR SERPL CREATININE-BSD FRML MDRD: 102 ML/MIN/1.73SQ M
GLUCOSE SERPL-MCNC: 87 MG/DL (ref 65–140)
GLUCOSE UR STRIP-MCNC: NEGATIVE MG/DL
HCT VFR BLD AUTO: 39.4 % (ref 36.5–49.3)
HGB BLD-MCNC: 13.6 G/DL (ref 12–17)
HGB UR QL STRIP.AUTO: NEGATIVE
IMM GRANULOCYTES # BLD AUTO: 0 THOUSAND/UL (ref 0–0.2)
IMM GRANULOCYTES NFR BLD AUTO: 0 % (ref 0–2)
KETONES UR STRIP-MCNC: NEGATIVE MG/DL
LEUKOCYTE ESTERASE UR QL STRIP: NEGATIVE
LYMPHOCYTES # BLD AUTO: 1.97 THOUSANDS/ÂΜL (ref 0.6–4.47)
LYMPHOCYTES NFR BLD AUTO: 44 % (ref 14–44)
MCH RBC QN AUTO: 32.9 PG (ref 26.8–34.3)
MCHC RBC AUTO-ENTMCNC: 34.5 G/DL (ref 31.4–37.4)
MCV RBC AUTO: 95 FL (ref 82–98)
MONOCYTES # BLD AUTO: 0.44 THOUSAND/ÂΜL (ref 0.17–1.22)
MONOCYTES NFR BLD AUTO: 10 % (ref 4–12)
MUCOUS THREADS UR QL AUTO: ABNORMAL
NEUTROPHILS # BLD AUTO: 1.85 THOUSANDS/ÂΜL (ref 1.85–7.62)
NEUTS SEG NFR BLD AUTO: 42 % (ref 43–75)
NITRITE UR QL STRIP: NEGATIVE
NON-SQ EPI CELLS URNS QL MICRO: ABNORMAL /HPF
NRBC BLD AUTO-RTO: 0 /100 WBCS
PH UR STRIP.AUTO: 6 [PH]
PLATELET # BLD AUTO: 201 THOUSANDS/UL (ref 149–390)
PMV BLD AUTO: 9.3 FL (ref 8.9–12.7)
POTASSIUM SERPL-SCNC: 3.8 MMOL/L (ref 3.5–5.3)
PROT SERPL-MCNC: 7.1 G/DL (ref 6.4–8.4)
PROT UR STRIP-MCNC: ABNORMAL MG/DL
RBC # BLD AUTO: 4.13 MILLION/UL (ref 3.88–5.62)
RBC #/AREA URNS AUTO: ABNORMAL /HPF
SODIUM SERPL-SCNC: 137 MMOL/L (ref 135–147)
SP GR UR STRIP.AUTO: >=1.05 (ref 1–1.03)
UROBILINOGEN UR STRIP-ACNC: 2 MG/DL
WBC # BLD AUTO: 4.42 THOUSAND/UL (ref 4.31–10.16)
WBC #/AREA URNS AUTO: ABNORMAL /HPF

## 2025-05-19 PROCEDURE — 85025 COMPLETE CBC W/AUTO DIFF WBC: CPT

## 2025-05-19 PROCEDURE — 80053 COMPREHEN METABOLIC PANEL: CPT

## 2025-05-19 PROCEDURE — 74177 CT ABD & PELVIS W/CONTRAST: CPT

## 2025-05-19 PROCEDURE — 36415 COLL VENOUS BLD VENIPUNCTURE: CPT

## 2025-05-19 PROCEDURE — 81001 URINALYSIS AUTO W/SCOPE: CPT

## 2025-05-19 RX ORDER — LIDOCAINE 50 MG/G
1 PATCH TOPICAL ONCE
Status: DISCONTINUED | OUTPATIENT
Start: 2025-05-19 | End: 2025-05-19 | Stop reason: HOSPADM

## 2025-05-19 RX ORDER — LIDOCAINE 50 MG/G
1 PATCH TOPICAL DAILY
Qty: 7 PATCH | Refills: 0 | Status: SHIPPED | OUTPATIENT
Start: 2025-05-19

## 2025-05-19 RX ORDER — NAPROXEN 500 MG/1
500 TABLET ORAL 2 TIMES DAILY WITH MEALS
Qty: 14 TABLET | Refills: 0 | Status: SHIPPED | OUTPATIENT
Start: 2025-05-19 | End: 2025-05-26

## 2025-05-19 RX ORDER — HYDROMORPHONE HCL/PF 1 MG/ML
0.5 SYRINGE (ML) INJECTION ONCE
Status: COMPLETED | OUTPATIENT
Start: 2025-05-19 | End: 2025-05-19

## 2025-05-19 RX ORDER — KETOROLAC TROMETHAMINE 30 MG/ML
15 INJECTION, SOLUTION INTRAMUSCULAR; INTRAVENOUS ONCE
Status: COMPLETED | OUTPATIENT
Start: 2025-05-19 | End: 2025-05-19

## 2025-05-19 RX ORDER — ACETAMINOPHEN 325 MG/1
975 TABLET ORAL ONCE
Status: COMPLETED | OUTPATIENT
Start: 2025-05-19 | End: 2025-05-19

## 2025-05-19 RX ADMIN — HYDROMORPHONE HYDROCHLORIDE 0.5 MG: 1 INJECTION, SOLUTION INTRAMUSCULAR; INTRAVENOUS; SUBCUTANEOUS at 04:21

## 2025-05-19 RX ADMIN — KETOROLAC TROMETHAMINE 15 MG: 30 INJECTION, SOLUTION INTRAMUSCULAR; INTRAVENOUS at 02:08

## 2025-05-19 RX ADMIN — ACETAMINOPHEN 975 MG: 325 TABLET, FILM COATED ORAL at 02:02

## 2025-05-19 RX ADMIN — LIDOCAINE 1 PATCH: 50 PATCH CUTANEOUS at 02:02

## 2025-05-19 RX ADMIN — IOHEXOL 100 ML: 350 INJECTION, SOLUTION INTRAVENOUS at 03:18

## 2025-05-19 NOTE — DISCHARGE INSTRUCTIONS
You were evaluated in the Emergency Department today for your back pain. Your evaluation did not show signs of medical conditions requiring emergent intervention at this time, and we feel safe discharging you home.    I gave you a prescription for naproxen and lidoderm patches. These are at your pharmacy. Please take as prescribed. You can also take tylenol for pain. This medication is over the counter.    Please schedule an appointment for follow-up with your primary care physician this week for further evaluation of your symptoms. I also wrote a referral for the comprehensive spine program. Please follow up with them.    You had some incidental findings on CT imaging that need follow up with comprehensive spine, primary care, and orthopedic, for whom I referred you to.    Return to the Emergency Department if you experience worsening back pain, difficulty walking, fevers, numbness, tingling, difficulty urinating, incontinence, or any other concerning symptoms.    Thank you for choosing us for your care.    Usted fue evaluado hoy en el Departamento de Emergencias por paz dolor de espalda. Paz evaluación no mostró signos de condiciones médicas que requieran gayle intervención urgente en gladis momento, y nos sentimos seguros de darle el fang a paz casa.    Te receté parches de naproxeno y lidoderm. Estos están en tu farmacia. Tómelo según lo prescrito. También puedes julio tylenol para el dolor. Gladis medicamento es de venta pat.    Programe gayle rylee de seguimiento con paz médico de atención primaria esta semana para gayle evaluación adicional de isrrael síntomas. También escribí gayle referencia para el programa integral de columna. Por favor lisa un seguimiento con ellos.    Regrese al Departamento de Emergencias si experimenta un empeoramiento del dolor de espalda, dificultad para caminar, fiebre, entumecimiento, hormigueo, dificultad para orinar, incontinencia o cualquier otro síntoma preocupante.    Gail por elegirnos para  paz atención.

## 2025-05-19 NOTE — ED PROVIDER NOTES
Time reflects when diagnosis was documented in both MDM as applicable and the Disposition within this note       Time User Action Codes Description Comment    5/19/2025  4:48 AM Charlie Vasquez [M54.50] Acute low back pain     5/19/2025  4:48 AM Charlie Vasquez [R93.89] Abnormal CT scan           ED Disposition       ED Disposition   Discharge    Condition   Stable    Date/Time   Mon May 19, 2025  4:48 AM    Comment   Marcus Tremayne Williams discharge to home/self care.                   Assessment & Plan       Medical Decision Making  Patient with history as below presented with back pain. Patient presents hemodynamically stable with vitals within normal limits. I considered the patient's chronic medical conditions including their epilepsy in forming my differential diagnosis, plan, and my medical decision making.  History obtained from patient.    After initial evaluation differential diagnosis includes: Muscle strain, muscle spasm, disc herniation, ureteral colic.     Plan: Initial testing to include CBC, CMP, CT abdomen pelvis, lumbar recons. Initial therapeutics to include Toradol, Tylenol, Lidoderm.     After initial evaluation and testing, labs reviewed and unremarkable. 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 Patient was treated therapeutically with below with improvement in symptoms. Reassessed the patient and they continue to be well appearing, ambulating appropriately with improvement in symptoms. Given distribution of his pain as well as CT imaging, I suspect the patient's presentation is due to muscle strain/spasm versus disc herniation.  He does have an area of probable avascular necrosis which may be contributing.  These seem to be longstanding issues that are having an acute exacerbation.  He has no signs of cauda equina or cord compression.  I believe patient can follow-up closely as an outpatient with  both comprehensive spine as well as orthopedics for duration of MRI as an outpatient.  Given prescriptions for naproxen and Lidoderm.  Given referral to orthopedics and comprehensive spine. Stable for outpatient management.    Disposition: Discharged with instructions to obtain outpatient follow up of patient's symptoms and findings, with strict return precautions if patient develops new or worsening symptoms. Patient understands this plan and is agreeable. All questions answered. Patient discharged home with return precautions.    Amount and/or Complexity of Data Reviewed  Labs: ordered.  Radiology: ordered.    Risk  OTC drugs.  Prescription drug management.             Medications   ketorolac (TORADOL) injection 15 mg (15 mg Intramuscular Given 5/19/25 0208)   acetaminophen (TYLENOL) tablet 975 mg (975 mg Oral Given 5/19/25 0202)   iohexol (OMNIPAQUE) 350 MG/ML injection (MULTI-DOSE) 100 mL (100 mL Intravenous Given 5/19/25 0318)   HYDROmorphone (DILAUDID) injection 0.5 mg (0.5 mg Intravenous Given 5/19/25 0421)       ED Risk Strat Scores                    No data recorded        SBIRT 20yo+      Flowsheet Row Most Recent Value   Initial Alcohol Screen: US AUDIT-C     1. How often do you have a drink containing alcohol? 1 Filed at: 05/19/2025 0230   2. How many drinks containing alcohol do you have on a typical day you are drinking?  0 Filed at: 05/19/2025 0230   3a. Male UNDER 65: How often do you have five or more drinks on one occasion? 0 Filed at: 05/19/2025 0230   Audit-C Score 1 Filed at: 05/19/2025 0230   J[place]AN: How many times in the past year have you...    Used an illegal drug or used a prescription medication for non-medical reasons? Never Filed at: 05/19/2025 0230                            History of Present Illness       Chief Complaint   Patient presents with    Back Pain     Pt states back pain that has been worsening, has hx of sciatica issues as well as kidney stones, no difficulty urinating        Past Medical History:   Diagnosis Date    Epilepsy (HCC)       History reviewed. No pertinent surgical history.   History reviewed. No pertinent family history.   Social History[1]   E-Cigarette/Vaping    E-Cigarette Use Current Every Day User       E-Cigarette/Vaping Substances    Nicotine Yes     Flavoring Yes       I have reviewed and agree with the history as documented.     Patient is a 39-year-old male with significant past medical history of epilepsy, presenting for evaluation of left-sided back and abdominal pain.  Patient reports that he has a relatively chronic history of what he believes to be left-sided sciatica.  He is unsure of exactly how who diagnosed him with this.  He says that he intermittently gets some episodes of pain in his left-sided lower back with some radiation into his buttocks and his left-sided abdomen.  He also reports history of kidney stone.  He says that earlier today he began experiencing some pain in his left-sided lower back similar to previous.  He once again describes a sharp pain that radiates from the left-sided lower back and into the left side abdomen and occasionally down his buttocks.  It is worsened with motion and relieved by rest.  He denies any saddle anesthesia or change in sensation.  He denies any urinary retention or incontinence.  He denies any direct trauma to the area.  He has not attempted any medications for this.  He is otherwise without complaint.        Review of Systems   Cardiovascular:  Negative for chest pain.   Gastrointestinal:  Negative for abdominal pain.   Genitourinary:  Negative for difficulty urinating and enuresis.   Musculoskeletal:  Positive for back pain.   Neurological:  Negative for weakness and numbness.           Objective       ED Triage Vitals   Temperature Pulse Blood Pressure Respirations SpO2 Patient Position - Orthostatic VS   05/19/25 0128 05/19/25 0128 05/19/25 0128 05/19/25 0128 05/19/25 0128 --   98.1 °F (36.7 °C) 65  131/62 17 97 %       Temp Source Heart Rate Source BP Location FiO2 (%) Pain Score    05/19/25 0128 05/19/25 0128 -- -- 05/19/25 0421    Oral Monitor   10 - Worst Possible Pain      Vitals      Date and Time Temp Pulse SpO2 Resp BP Pain Score FACES Pain Rating User   05/19/25 0421 -- -- -- -- -- 10 - Worst Possible Pain --    05/19/25 0332 98 °F (36.7 °C) 62 97 % 14 127/85 -- --    05/19/25 0128 98.1 °F (36.7 °C) 65 97 % 17 131/62 -- -- ES            Physical Exam  Vitals and nursing note reviewed.   Constitutional:       General: He is not in acute distress.     Appearance: Normal appearance. He is not ill-appearing or toxic-appearing.   HENT:      Head: Normocephalic and atraumatic.      Right Ear: External ear normal.      Left Ear: External ear normal.      Nose: Nose normal.     Eyes:      General: No scleral icterus.        Right eye: No discharge.         Left eye: No discharge.      Extraocular Movements: Extraocular movements intact.      Conjunctiva/sclera: Conjunctivae normal.       Cardiovascular:      Rate and Rhythm: Normal rate.      Heart sounds: Normal heart sounds. No murmur heard.     No friction rub. No gallop.   Pulmonary:      Effort: Pulmonary effort is normal. No respiratory distress.      Breath sounds: Normal breath sounds.   Abdominal:      General: Abdomen is flat. There is no distension.      Palpations: Abdomen is soft. There is no mass.      Tenderness: There is no abdominal tenderness.   Genitourinary:     Comments: Deferred    Musculoskeletal:      Comments: Tenderness of the left sided paralumbar muscles and lumbar vertebral spine. No stepoffs, deformities or skin changes. ROM limited due to pain with full strength. Strength of bilateral lower extremities 5/5 in ankle flexion and extension. EHL intact. Sensation intact including S1 distribution. DP/PT pulses 2+/4.      Skin:     General: Skin is warm and dry.     Neurological:      General: No focal deficit present.      Mental  Status: He is alert.         Results Reviewed       Procedure Component Value Units Date/Time    Urine Microscopic [[id number]]  (Abnormal) Collected: 05/19/25 0331    Lab Status: Final result Specimen: Urine, Clean Catch Updated: 05/19/25 0349     RBC, UA 1-2 /hpf      WBC, UA 2-4 /hpf      Epithelial Cells Occasional /hpf      Bacteria, UA None Seen /hpf      MUCUS THREADS Moderate    UA w Reflex to Microscopic w Reflex to Culture [[id number]]  (Abnormal) Collected: 05/19/25 0331    Lab Status: Final result Specimen: Urine, Clean Catch Updated: 05/19/25 0348     Color, UA Yellow     Clarity, UA Clear     Specific Gravity, UA >=1.050     pH, UA 6.0     Leukocytes, UA Negative     Nitrite, UA Negative     Protein, UA Trace mg/dl      Glucose, UA Negative mg/dl      Ketones, UA Negative mg/dl      Urobilinogen, UA 2.0 mg/dl      Bilirubin, UA Negative     Occult Blood, UA Negative    CMP [[id number]] Collected: 05/19/25 0158    Lab Status: Final result Specimen: Blood from Arm, Left Updated: 05/19/25 0218     Sodium 137 mmol/L      Potassium 3.8 mmol/L      Chloride 104 mmol/L      CO2 27 mmol/L      ANION GAP 6 mmol/L      BUN 14 mg/dL      Creatinine 0.93 mg/dL      Glucose 87 mg/dL      Calcium 9.2 mg/dL      AST 28 U/L      ALT 20 U/L      Alkaline Phosphatase 71 U/L      Total Protein 7.1 g/dL      Albumin 4.1 g/dL      Total Bilirubin 0.44 mg/dL      eGFR 102 ml/min/1.73sq m     Narrative:      National Kid[employer] Foundation guidelines for Chronic Kidney Disease (CKD):     Stage 1 with normal or high GFR (GFR > 90 mL/min/1.73 square meters)    Stage 2 Mild CKD (GFR = 60-89 mL/min/1.73 square meters)    Stage 3A Moderate CKD (GFR = 45-59 mL/min/1.73 square meters)    Stage 3B Moderate CKD (GFR = 30-44 mL/min/1.73 square meters)    Stage 4 Severe CKD (GFR = 15-29 mL/min/1.73 square meters)    Stage 5 End Stage CKD (GFR <15 mL/min/1.73 square meters)  Note: GFR calculation is accurate only with a steady state  creatinine    CBC and differential [[id number]]  (Abnormal) Collected: 05/19/25 0158    Lab Status: Final result Specimen: Blood from Arm, Left Updated: 05/19/25 0203     WBC 4.42 Thousand/uL      RBC 4.13 Million/uL      Hemoglobin 13.6 g/dL      Hematocrit 39.4 %      MCV 95 fL      MCH 32.9 pg      MCHC 34.5 g/dL      RDW 13.4 %      MPV 9.3 fL      Platelets 201 Thousands/uL      nRBC 0 /100 WBCs      Segmented % 42 %      Immature Grans % 0 %      Lymphocytes % 44 %      Monocytes % 10 %      Eosinophils Relative 3 %      Basophils Relative 1 %      Absolute Neutrophils 1.85 Thousands/µL      Absolute Immature Grans 0.00 Thousand/uL      Absolute Lymphocytes 1.97 Thousands/µL      Absolute Monocytes 0.44 Thousand/µL      Eosinophils Absolute 0.13 Thousand/µL      Basophils Absolute 0.03 Thousands/µL             CT abdomen pelvis with contrast   Final Interpretation by Verna Li MD (05/19 0348)      There is a tiny punctate nonobstructing calculus in the lower pole left kidney. No evidence of obstructive uropathy. No hydronephrosis bilaterally.      Spinal degenerative changes, as described above. Please see discussion. Clinical correlation is recommended. Consider MRI for further evaluation, if there are no contraindications.      This examination demonstrates findings for which clinical and imaging follow-up is recommended and was logged as such in EPIC.      The study was marked in EPIC for immediate notification.      Workstation performed: [id number]         CT recon only lumbar spine   Final Interpretation by Verna Li MD (05/19 0348)      There is a tiny punctate nonobstructing calculus in the lower pole left kidney. No evidence of obstructive uropathy. No hydronephrosis bilaterally.      Spinal degenerative changes, as described above. Please see discussion. Clinical correlation is recommended. Consider MRI for further evaluation, if there are no contraindications.      This  examination demonstrates findings for which clinical and imaging follow-up is recommended and was logged as such in EPIC.      The study was marked in EPIC for immediate notification.      Workstation performed: [id number]             Procedures    ED Medication and Procedure Management   Prior to Admission Medications   Prescriptions Last Dose Informant Patient Reported? Taking?   acetaminophen (TYLENOL) 500 mg tablet   No No   Sig: Take 2 tablets (1,000 mg total) by mouth every 6 (six) hours as needed for mild pain   albuterol (ProAir HFA) 90 mcg/act inhaler   No No   Sig: Inhale 2 puffs every 6 (six) hours as needed for wheezing   buPROPion (WELLBUTRIN) 75 mg tablet   No No   Sig: Take 1 tablet (75 mg total) by mouth 2 (two) times a day for 7 days   ibuprofen (MOTRIN) 800 mg tablet   No No   Sig: Take 1 tablet (800 mg total) by mouth every 8 (eight) hours as needed for mild pain   levETIRAcetam (KEPPRA) 750 mg tablet   Yes No   Sig: Take 750 mg by mouth 2 (two) times a day   levETIRAcetam (Keppra) 750 mg tablet   No No   Sig: Take 1 tablet (750 mg total) by mouth 2 (two) times a day   naproxen (NAPROSYN) 500 mg tablet   No No   Sig: Take 1 tablet (500 mg total) by mouth 2 (two) times a day with meals   ondansetron (ZOFRAN-ODT) 4 mg disintegrating tablet   No No   Sig: Take 1 tablet (4 mg total) by mouth every 6 (six) hours as needed for nausea   podofilox (CONDYLOX) 0.5 % external solution   No No   Sig: Apply topically 2 (two) times a day Apply topically twice daily for 3 days, then discontinue for 4 consecutive days.  This cycle may be repeated until symptoms resolve      Facility-Administered Medications: None     Discharge Medication List as of 5/19/2025  4:51 AM        START taking these medications    Details   lidocaine (Lidoderm) 5 % Apply 1 patch topically daily over 12 hours Remove & Discard patch within 12 hours or as directed by MD, Starting Mon 5/19/2025, Normal      !! naproxen (Naprosyn) 500 mg  tablet Take 1 tablet (500 mg total) by mouth 2 (two) times a day with meals for 7 days, Starting Mon 5/19/2025, Until Mon 5/26/2025, Normal       !! - Potential duplicate medications found. Please discuss with provider.        CONTINUE these medications which have NOT CHANGED    Details   acetaminophen (TYLENOL) 500 mg tablet Take 2 tablets (1,000 mg total) by mouth every 6 (six) hours as needed for mild pain, Starting Sun 8/4/2024, Normal      albuterol (ProAir HFA) 90 mcg/act inhaler Inhale 2 puffs every 6 (six) hours as needed for wheezing, Starting Fri 9/1/2023, Normal      buPROPion (WELLBUTRIN) 75 mg tablet Take 1 tablet (75 mg total) by mouth 2 (two) times a day for 7 days, Starting Mon 9/4/2023, Until Mon 9/11/2023, Normal      ibuprofen (MOTRIN) 800 mg tablet Take 1 tablet (800 mg total) by mouth every 8 (eight) hours as needed for mild pain, Starting Sun 8/4/2024, Normal      !! levETIRAcetam (KEPPRA) 750 mg tablet Take 750 mg by mouth 2 (two) times a day, Historical Med      !! levETIRAcetam (Keppra) 750 mg tablet Take 1 tablet (750 mg total) by mouth 2 (two) times a day, Starting Fri 9/1/2023, Normal      !! naproxen (NAPROSYN) 500 mg tablet Take 1 tablet (500 mg total) by mouth 2 (two) times a day with meals, Starting Tue 1/28/2025, Normal      ondansetron (ZOFRAN-ODT) 4 mg disintegrating tablet Take 1 tablet (4 mg total) by mouth every 6 (six) hours as needed for nausea, Starting Tue 1/28/2025, Normal      podofilox (CONDYLOX) 0.5 % external solution Apply topically 2 (two) times a day Apply topically twice daily for 3 days, then discontinue for 4 consecutive days.  This cycle may be repeated until symptoms resolve, Starting Thu 4/24/2025, Normal       !! - Potential duplicate medications found. Please discuss with provider.          ED SEPSIS DOCUMENTATION   Time reflects when diagnosis was documented in both MDM as applicable and the Disposition within this note       Time User Action Codes  Description Comment    5/19/2025  4:48 AM Charlie Vasquez [M54.50] Acute low back pain     5/19/2025  4:48 AM Charlie Vasquez [R93.89] Abnormal CT scan                      [1]   Social History  Tobacco Use    Smoking status: Every Day     Types: Cigars    Smokeless tobacco: Never   Vaping Use    Vaping status: Every Day    Substances: Nicotine, Flavoring   Substance Use Topics    Alcohol use: Yes     Comment: occ    Drug use: Yes     Frequency: 4.0 times per week     Types: Marijuana        Charlie Vasquez DO  05/19/25 0723

## 2025-05-19 NOTE — INCIDENTAL FINDINGS
The following findings require follow up:  Radiographic finding   Finding: Probable AVN without collapse left femoral head. Spinal degenerative changes. Disc space narrowing and degenerative endplate changes are most pronounced at L5-S1, followed by L4-5. Probable disc   bulges L3-4 and L4-5. Probable small disc osteophyte complex at L5-S1. Possible disc bulge L2-3.   Follow up required: Ortho/comprehensive spine, possible MRI   Follow up should be done within 1 month(s)    Please notify the following clinician to assist with the follow up:   Primary care doctor    Incidental finding results were discussed with the Patient by Pedro Guerra DO on 05/19/25.   They expressed understanding and all questions answered.

## 2025-05-20 ENCOUNTER — NURSE TRIAGE (OUTPATIENT)
Dept: PHYSICAL THERAPY | Facility: OTHER | Age: 40
End: 2025-05-20

## 2025-05-20 DIAGNOSIS — M54.50 ACUTE LEFT-SIDED LOW BACK PAIN, UNSPECIFIED WHETHER SCIATICA PRESENT: Primary | ICD-10-CM

## 2025-05-20 NOTE — TELEPHONE ENCOUNTER
"Additional Information   Negative: Is this related to a work injury?   Negative: Is this related to an MVA?   Negative: Are you currently recieving homecare services?    Background - Initial Assessment  Clinical complaint: Pain is left low back, radiates  to left buttock and abdomen. No numbness or tingling. Started 5/15/25. NKI. Pt states his pain worsened on 5/19/25. He fell down stairs in his home due to the pain. Pt believes he was DX with left sided sciatica before in [place]. Pt has not had any back SX. Is not following any doctors for his pain. States he has had 2 flare ups since being in [place]. Getting up makes the pain worsen. Sitting in chair, sitting up is better for the pain. Nothing alleviates it all together. Pain is constant. Seen in ED 5/19/25. Pt is following up with Ortho on 5/21/25  Date of onset: 5/15/25  Frequency of pain: constant  Quality of pain: \"all of the above:    Protocols used: [facility] Protocol    "

## 2025-05-20 NOTE — TELEPHONE ENCOUNTER
Additional Information   Negative: Has the patient had unexplained weight loss?   Negative: Does the patient have a fever?   Negative: Is the patient experiencing urine retention?   Negative: Is the patient experiencing blood in sputum?   Negative: Has the patient experienced major trauma? (fall from height, high speed collision, direct blow to spine) and is also experiencing nausea, light-headedness, or loss of consciousness?   Negative: Is the patient experiencing acute drop foot or paralysis?   Negative: Is this a chronic condition?    Protocols used: [facility] Protocol  Nurse completed the triage and NO RF s/s were present.  Referral entered for the [facility] site and the contact/phone number information for the office was given to the patient as well.   Patients information was sent to the preferred site and pt was made aware that clerical would be calling to schedule the evaluation appointment. Nurse encouraged him to call the site if he does not hear from clerical beforehand. Patient Agreed.  Nurse also offered a call from the  counselor d/t SBT score. Patient declined. Patient was pleasant and appropriate during this encounter.   Patient did not voice any additional questions or concerns at this time.   Patient is aware current/past complaints,  any relevant dx, additional referrals and treatment/options will be discussed at the time of his evaluation/consultation appointment.   Patient is in agreement with plan.    Patient was very appreciative of the f/u call to complete his triage and referral placement.     Nurse wished him well and the [facility] referral was closed.

## 2025-05-21 VITALS — WEIGHT: 171 LBS | HEIGHT: 73 IN | BODY MASS INDEX: 22.66 KG/M2

## 2025-05-21 DIAGNOSIS — M54.50 ACUTE BILATERAL LOW BACK PAIN WITHOUT SCIATICA: Primary | ICD-10-CM

## 2025-05-21 DIAGNOSIS — R93.89 ABNORMAL CT SCAN: ICD-10-CM

## 2025-05-21 PROCEDURE — 99204 OFFICE O/P NEW MOD 45 MIN: CPT | Performed by: FAMILY MEDICINE

## 2025-05-21 RX ORDER — ACETAMINOPHEN 500 MG
500 TABLET ORAL 2 TIMES DAILY
Qty: 14 TABLET | Refills: 0 | Status: SHIPPED | OUTPATIENT
Start: 2025-05-21

## 2025-05-21 NOTE — PROGRESS NOTES
Assessment:   Assessment & Plan        Diagnosis and Orders:      1. Acute bilateral low back pain without sciatica  acetaminophen (TYLENOL) 500 mg tablet    Ambulatory Referral to [facility]        Orders Placed This Encounter   Procedures    Ambulatory Referral to [facility]        Impression:   Low back pain pain likely multifactorial secondary to age-appropriate degenerative changing, as well as concerns for potential AVN of the left femoral head    Conservative Management   We discussed different treatment options:  Reviewed ED documentation completed on 05/19/2025.  Treatment plan consisted of labs, CT abdomen pelvis lumbar, Toradol, Tylenol, Lidoderm  Reviewed urine studies completed on 05/19/2025.  Red blood cell with white blood cell seen as well as epithelial cells and moderate mucus starting, no bacterial seen.  Discussed the importance of following up with PCP for this.  Patient expressed understanding.  States he will schedule an appointment.  Patient should continue with medication provided by emergency department naproxen 500 mg twice daily.  As well as topical Lidoderm 5%  Ice or Heat Therapy as needed 1-2 times daily for 10-20 minutes. As tolerated.   Over the counter Tylenol and/or NSAIDs  as needed based off your Past Medical Hx. Please follow product label for dosing and maximum limits.    Prescription for Tylenol 500 mg twice daily x 7 days was provided.  Patient should take this in conjunction with the naproxen 500 mg twice daily provided by the emergency department  Please range joint through gentle range of motion as tolerated.   Initiate Home Exercise Program for Stretching and Strengthening affected area.    Initiate Formal Physical Therapy at any preferred location.  Prescription provided.        Imaging   Reviewed prior xrays obtain in Urgent or Emergency Department. These were reviewed in office with patient today.   05/19/2025: BONES: No acute fracture  or suspicious osseous lesion. 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 Spinal degenerative changes. Disc space narrowing and degenerative endplate changes are most pronounced at L5-S1, followed by L4-5. Probable disc bulges L3-4 and L4-5. Probable small disc osteophyte complex at L5-S1. Possible disc bulge L2-3.  Pending left hip MRI; due to hip immobility and pain    Procedure  Not appropriate at this time.     Shared decision making, patient agreeable to plan.      Return for Follow up after 6-8 wks of formal therapy.    HPI:   Marcus Tremayne Williams is a 39 y.o. male  who presents for evaluation of   Chief Complaint   Patient presents with    Spine - Pain     Lumbar        Injury Related: No     Onset/Mechanism: Patient with left-sided low back pain for months.  Denies any direct trauma..  Location: Left-sided low back..  Severity:  Max severity: 10/10.  Pain described as: Constant and intermittent throbbing, stiffness, pressure  Radiation: Will radiate down the leg..  Provocative: Stairs, running, squatting, lifting.  Sitting for too long      Denies any hx of fracture of affected limb.   Denies any surgical history of affected limb.      Summary of treatment to-date:   Nothing today.  Had prior back pain like this 1 year ago that resolved on its own      Following History Reviewed and Updated     Past Medical History:   Diagnosis Date    Epilepsy (HCC)      History reviewed. No pertinent surgical history.  History reviewed. No pertinent family history.    Social History     Substance and Sexual Activity   Alcohol Use Yes    Comment: occ     Social History     Substance and Sexual Activity   Drug Use Yes    Frequency: 4.0 times per week    Types: Marijuana     Tobacco Use History[1]    Social Drivers of Health     Tobacco Use: High Risk (5/21/2025)    Patient History     Smoking Tobacco Use: Every Day     Smokeless Tobacco Use: Never     Passive Exposure: Not on file   Alcohol Use: Not on file  "  Financial Resource Strain: Not on file   Food Insecurity: Not on file   Transportation Needs: Not on file   Physical Activity: Not on file   Stress: Not on file   Social Connections: Unknown (6/18/2024)    Received from [facility]     How often do you feel lonely or isolated from those around you? (Adult - for ages 18 years and over): Not on file   Intimate Partner Violence: Not on file   Depression: Not on file   Housing Stability: Not on file   Utilities: Unknown (6/18/2024)    Received from [facility]     Do you have trouble paying your heating, water, or electric bill? (Adult - for ages 18 years and over): Not on file     Is your family able to pay the heat, water, or electric bill? (Household - for ages 0-17 years): Not on file     Does your family have access to good internet? (Household - for ages 0-17 years): Not on file   Health Literacy: Not on file        Allergies[2]    Review of Systems      Review of Systems     Review of Systems   Constitutional: Negative for chills and fever.   HENT: Negative for drooling and sneezing.    Eyes: Negative for redness.   Respiratory: Negative for cough and wheezing.    Gastrointestinal: Negative for vomiting.   Psychiatric/Behavioral: Negative for behavioral problems. The patient is not nervous/anxious.      All other systems negative.   Physical Exam   Physical Exam    Vitals and nursing note reviewed.  Constitutional:   Appearance. Normal Appearance.  Ht 6' 1\" (1.854 m)   Wt 77.6 kg (171 lb)   BMI 22.56 kg/m²     Body mass index is 22.56 kg/m².   HENT:  Head: Atraumatic.  Nose: Nose normal  Eyes: Conjunctiva/sclera: Conjunctivae normal.  Cardiovascular:   Rate and Rhythm: Bilateral equal distal pulses  Pulmonary:   Effort: Pulmonary effort is normal  Skin:   General: Skin is warm and dry.  Neurological:   General: No focal deficit present.  Mental Status: Alert and oriented to person, place, and time.   Psychiatric:   Mood and " Affect: mood normal.  Behavior: Behavior normal     Musculoskeletal Exam     Ortho Exam       B/L HIP and BACK     Inspection:  Gait Gross deformity Erythema Warmth   Normal Negative Negative Negative     TENDERNESS:  Thoracic/Lumbar Spinous Processes Paraspinal Muscles SI Joint: Sacrum CODY Greater Trochanteric Bursa   Negative Tightness in bilateral paraspinals, pain on the left side Negative  Negative     LUMBAR Range of Motion:  Flexion Extension Sidebending Rotation   Limited with pain in the low back  Intact Limited with back in the low back  Intact     HIP Range of Motion:  Hip Supine Supine Prone Prone   Flexion ER IR ER IR   Intact and symmetrical  Limited bilaterally with discomfort on the left Limited bilaterally with discomfort on the left       DERMATOMAL SENSATION:  L1 L2 L3 L4 L5 S1   Intact Intact Intact Intact Intact Intact     STRENGTH (bilateral):  Hip flexion Knee Flexion Knee extension Foot dorsiflexion Great toe extension Foot plantarflexion   5/5 5/5 5/5 5/5 5/5 5/5     SPECIAL TESTS:   B/L Hip  Logroll JORGE FADIR Carli's Popliteal angle Supine straight leg Prone straight leg   Negative Equivocal, pain in the left low back Equivocal, pain in the left low back   Negative N/A     SI JOINT ASIS COMPRESSION TEST:  STORK TEST:  FORTON'S FINGER: JORGE SI PAIN:   Negative  Negative  N/A Negative  Negative      Special Tests:   Conor test Bicycle test Adductor squeeze Piriformis TEST:   GAENSLIN'S TEST:  Pubalgia   Supine, unaffected hip is hyperflexed Supine, opposite active b/l hip flexion / extension  Seated, hips flexed 45, active activation of adductors  Lies on unaffected hip with knees flexed and abducts against resistance  Hyperflexed unaffected hip, extended hip has downward force applied  Lying supine, perform sit-up   Negative or without hip flexion contracture of contralateral leg   Negative for pain or weakness N/A         Neurovascular:  Sensation to light touch Posterior tibial artery  "  Intact and equal bilaterally Intact and equal bilaterally     (Test not completed if space left blank )           Procedures       Portions of the record may have been created with voice recognition software. Occasional wrong word or \"sound alike\" substitutions may have occurred due to the inherent limitations of voice recognition software. Please review the chart carefully and recognize, using context, where substitutions/typographical errors may have occurred.          [1]   Social History  Tobacco Use   Smoking Status Every Day    Types: Cigars   Smokeless Tobacco Never   [2]   Allergies  Allergen Reactions    Buspar [Buspirone] Hives     "

## 2025-05-27 ENCOUNTER — HOSPITAL ENCOUNTER (OUTPATIENT)
Dept: MRI IMAGING | Facility: HOSPITAL | Age: 40
Discharge: HOME | End: 2025-05-27
Attending: FAMILY MEDICINE
Payer: COMMERCIAL

## 2025-05-27 DIAGNOSIS — R93.89 ABNORMAL CT SCAN: ICD-10-CM

## 2025-05-27 DIAGNOSIS — M54.50 ACUTE BILATERAL LOW BACK PAIN WITHOUT SCIATICA: ICD-10-CM

## 2025-05-27 PROCEDURE — 73721 MRI JNT OF LWR EXTRE W/O DYE: CPT

## 2025-06-02 DIAGNOSIS — M87.00 AVASCULAR NECROSIS (HCC): Primary | ICD-10-CM

## 2025-06-03 ENCOUNTER — TELEPHONE (OUTPATIENT)
Dept: OBGYN CLINIC | Facility: CLINIC | Age: 40
End: 2025-06-03

## 2025-06-03 ENCOUNTER — RESULTS FOLLOW-UP (OUTPATIENT)
Dept: OBGYN CLINIC | Facility: CLINIC | Age: 40
End: 2025-06-03